# Patient Record
Sex: MALE | Race: WHITE | NOT HISPANIC OR LATINO | Employment: FULL TIME | URBAN - METROPOLITAN AREA
[De-identification: names, ages, dates, MRNs, and addresses within clinical notes are randomized per-mention and may not be internally consistent; named-entity substitution may affect disease eponyms.]

---

## 2023-09-29 ENCOUNTER — HOSPITAL ENCOUNTER (EMERGENCY)
Facility: HOSPITAL | Age: 28
End: 2023-09-29
Attending: INTERNAL MEDICINE
Payer: COMMERCIAL

## 2023-09-29 ENCOUNTER — HOSPITAL ENCOUNTER (INPATIENT)
Facility: HOSPITAL | Age: 28
LOS: 2 days | Discharge: HOME | End: 2023-10-01
Attending: INTERNAL MEDICINE | Admitting: STUDENT IN AN ORGANIZED HEALTH CARE EDUCATION/TRAINING PROGRAM
Payer: COMMERCIAL

## 2023-09-29 VITALS
HEART RATE: 113 BPM | TEMPERATURE: 97.4 F | SYSTOLIC BLOOD PRESSURE: 170 MMHG | HEIGHT: 75 IN | DIASTOLIC BLOOD PRESSURE: 91 MMHG | RESPIRATION RATE: 20 BRPM | WEIGHT: 262.79 LBS | OXYGEN SATURATION: 99 % | BODY MASS INDEX: 32.67 KG/M2

## 2023-09-29 DIAGNOSIS — R11.2 NAUSEA AND VOMITING: ICD-10-CM

## 2023-09-29 DIAGNOSIS — E10.10 DKA, TYPE 1 (HCC): Primary | ICD-10-CM

## 2023-09-29 DIAGNOSIS — E08.10 DIABETIC KETOACIDOSIS WITHOUT COMA ASSOCIATED WITH DIABETES MELLITUS DUE TO UNDERLYING CONDITION (HCC): Primary | ICD-10-CM

## 2023-09-29 PROBLEM — I10 HTN (HYPERTENSION): Status: ACTIVE | Noted: 2023-09-29

## 2023-09-29 PROBLEM — E87.5 HYPERKALEMIA: Status: ACTIVE | Noted: 2023-09-29

## 2023-09-29 PROBLEM — R00.0 TACHYCARDIA: Status: ACTIVE | Noted: 2023-09-29

## 2023-09-29 LAB
ALBUMIN SERPL BCP-MCNC: 5.4 G/DL (ref 3.5–5)
ALP SERPL-CCNC: 125 U/L (ref 34–104)
ALT SERPL W P-5'-P-CCNC: 14 U/L (ref 7–52)
ANION GAP SERPL CALCULATED.3IONS-SCNC: 18 MMOL/L
ANION GAP SERPL CALCULATED.3IONS-SCNC: 20 MMOL/L
ANION GAP SERPL CALCULATED.3IONS-SCNC: 27 MMOL/L
ANION GAP SERPL CALCULATED.3IONS-SCNC: 30 MMOL/L
AST SERPL W P-5'-P-CCNC: 11 U/L (ref 13–39)
BACTERIA UR QL AUTO: ABNORMAL /HPF
BASE EX.OXY STD BLDV CALC-SCNC: 68.8 % (ref 60–80)
BASE EX.OXY STD BLDV CALC-SCNC: 94 % (ref 60–80)
BASE EXCESS BLDV CALC-SCNC: -13.1 MMOL/L
BASE EXCESS BLDV CALC-SCNC: -15.9 MMOL/L
BASOPHILS # BLD AUTO: 0.05 THOUSANDS/ÂΜL (ref 0–0.1)
BASOPHILS NFR BLD AUTO: 0 % (ref 0–1)
BETA-HYDROXYBUTYRATE: 4.8 MMOL/L
BILIRUB SERPL-MCNC: 0.57 MG/DL (ref 0.2–1)
BILIRUB UR QL STRIP: NEGATIVE
BUN SERPL-MCNC: 26 MG/DL (ref 5–25)
BUN SERPL-MCNC: 27 MG/DL (ref 5–25)
BUN SERPL-MCNC: 29 MG/DL (ref 5–25)
BUN SERPL-MCNC: 30 MG/DL (ref 5–25)
CA-I BLD-SCNC: 1.14 MMOL/L (ref 1.12–1.32)
CALCIUM SERPL-MCNC: 10.2 MG/DL (ref 8.4–10.2)
CALCIUM SERPL-MCNC: 8.7 MG/DL (ref 8.4–10.2)
CALCIUM SERPL-MCNC: 9.2 MG/DL (ref 8.4–10.2)
CALCIUM SERPL-MCNC: 9.9 MG/DL (ref 8.4–10.2)
CHLORIDE SERPL-SCNC: 102 MMOL/L (ref 96–108)
CHLORIDE SERPL-SCNC: 103 MMOL/L (ref 96–108)
CHLORIDE SERPL-SCNC: 90 MMOL/L (ref 96–108)
CHLORIDE SERPL-SCNC: 93 MMOL/L (ref 96–108)
CLARITY UR: CLEAR
CO2 SERPL-SCNC: 11 MMOL/L (ref 21–32)
CO2 SERPL-SCNC: 13 MMOL/L (ref 21–32)
CO2 SERPL-SCNC: 14 MMOL/L (ref 21–32)
CO2 SERPL-SCNC: 9 MMOL/L (ref 21–32)
COLOR UR: ABNORMAL
CREAT SERPL-MCNC: 1.12 MG/DL (ref 0.6–1.3)
CREAT SERPL-MCNC: 1.15 MG/DL (ref 0.6–1.3)
CREAT SERPL-MCNC: 1.26 MG/DL (ref 0.6–1.3)
CREAT SERPL-MCNC: 1.37 MG/DL (ref 0.6–1.3)
EOSINOPHIL # BLD AUTO: 0 THOUSAND/ÂΜL (ref 0–0.61)
EOSINOPHIL NFR BLD AUTO: 0 % (ref 0–6)
ERYTHROCYTE [DISTWIDTH] IN BLOOD BY AUTOMATED COUNT: 11.9 % (ref 11.6–15.1)
ERYTHROCYTE [DISTWIDTH] IN BLOOD BY AUTOMATED COUNT: 12.1 % (ref 11.6–15.1)
FLUAV RNA RESP QL NAA+PROBE: NEGATIVE
FLUBV RNA RESP QL NAA+PROBE: NEGATIVE
GFR SERPL CREATININE-BSD FRML MDRD: 68 ML/MIN/1.73SQ M
GFR SERPL CREATININE-BSD FRML MDRD: 76 ML/MIN/1.73SQ M
GFR SERPL CREATININE-BSD FRML MDRD: 84 ML/MIN/1.73SQ M
GFR SERPL CREATININE-BSD FRML MDRD: 87 ML/MIN/1.73SQ M
GLUCOSE SERPL-MCNC: 251 MG/DL (ref 65–140)
GLUCOSE SERPL-MCNC: 251 MG/DL (ref 65–140)
GLUCOSE SERPL-MCNC: 271 MG/DL (ref 65–140)
GLUCOSE SERPL-MCNC: 272 MG/DL (ref 65–140)
GLUCOSE SERPL-MCNC: 273 MG/DL (ref 65–140)
GLUCOSE SERPL-MCNC: 273 MG/DL (ref 65–140)
GLUCOSE SERPL-MCNC: 276 MG/DL (ref 65–140)
GLUCOSE SERPL-MCNC: 361 MG/DL (ref 65–140)
GLUCOSE SERPL-MCNC: 455 MG/DL (ref 65–140)
GLUCOSE SERPL-MCNC: 472 MG/DL (ref 65–140)
GLUCOSE SERPL-MCNC: 526 MG/DL (ref 65–140)
GLUCOSE SERPL-MCNC: >500 MG/DL (ref 65–140)
GLUCOSE UR STRIP-MCNC: ABNORMAL MG/DL
HCO3 BLDV-SCNC: 10.1 MMOL/L (ref 24–30)
HCO3 BLDV-SCNC: 13.7 MMOL/L (ref 24–30)
HCT VFR BLD AUTO: 45.6 % (ref 36.5–49.3)
HCT VFR BLD AUTO: 51 % (ref 36.5–49.3)
HGB BLD-MCNC: 14.9 G/DL (ref 12–17)
HGB BLD-MCNC: 17.3 G/DL (ref 12–17)
HGB UR QL STRIP.AUTO: NEGATIVE
IMM GRANULOCYTES # BLD AUTO: 0.08 THOUSAND/UL (ref 0–0.2)
IMM GRANULOCYTES NFR BLD AUTO: 1 % (ref 0–2)
KETONES UR STRIP-MCNC: ABNORMAL MG/DL
LEUKOCYTE ESTERASE UR QL STRIP: NEGATIVE
LIPASE SERPL-CCNC: 8 U/L (ref 11–82)
LYMPHOCYTES # BLD AUTO: 2.48 THOUSANDS/ÂΜL (ref 0.6–4.47)
LYMPHOCYTES NFR BLD AUTO: 16 % (ref 14–44)
MAGNESIUM SERPL-MCNC: 2.3 MG/DL (ref 1.9–2.7)
MCH RBC QN AUTO: 27.6 PG (ref 26.8–34.3)
MCH RBC QN AUTO: 27.9 PG (ref 26.8–34.3)
MCHC RBC AUTO-ENTMCNC: 32.7 G/DL (ref 31.4–37.4)
MCHC RBC AUTO-ENTMCNC: 33.9 G/DL (ref 31.4–37.4)
MCV RBC AUTO: 82 FL (ref 82–98)
MCV RBC AUTO: 84 FL (ref 82–98)
MONOCYTES # BLD AUTO: 0.78 THOUSAND/ÂΜL (ref 0.17–1.22)
MONOCYTES NFR BLD AUTO: 5 % (ref 4–12)
MUCOUS THREADS UR QL AUTO: ABNORMAL
NEUTROPHILS # BLD AUTO: 12.3 THOUSANDS/ÂΜL (ref 1.85–7.62)
NEUTS SEG NFR BLD AUTO: 78 % (ref 43–75)
NITRITE UR QL STRIP: NEGATIVE
NON-SQ EPI CELLS URNS QL MICRO: ABNORMAL /HPF
NRBC BLD AUTO-RTO: 0 /100 WBCS
O2 CT BLDV-SCNC: 15.2 ML/DL
O2 CT BLDV-SCNC: 23.8 ML/DL
PCO2 BLDV: 26.3 MM HG (ref 42–50)
PCO2 BLDV: 34.9 MM HG (ref 42–50)
PH BLDV: 7.2 [PH] (ref 7.3–7.4)
PH BLDV: 7.21 [PH] (ref 7.3–7.4)
PH UR STRIP.AUTO: 5 [PH]
PHOSPHATE SERPL-MCNC: 3.8 MG/DL (ref 2.7–4.5)
PHOSPHATE SERPL-MCNC: 4.5 MG/DL (ref 2.7–4.5)
PHOSPHATE SERPL-MCNC: 5.7 MG/DL (ref 2.7–4.5)
PLATELET # BLD AUTO: 238 THOUSANDS/UL (ref 149–390)
PLATELET # BLD AUTO: 270 THOUSANDS/UL (ref 149–390)
PMV BLD AUTO: 10.5 FL (ref 8.9–12.7)
PMV BLD AUTO: 10.8 FL (ref 8.9–12.7)
PO2 BLDV: 38.7 MM HG (ref 35–45)
PO2 BLDV: 87.3 MM HG (ref 35–45)
POTASSIUM SERPL-SCNC: 4.6 MMOL/L (ref 3.5–5.3)
POTASSIUM SERPL-SCNC: 5.4 MMOL/L (ref 3.5–5.3)
POTASSIUM SERPL-SCNC: 5.4 MMOL/L (ref 3.5–5.3)
POTASSIUM SERPL-SCNC: 5.5 MMOL/L (ref 3.5–5.3)
PROT SERPL-MCNC: 9.6 G/DL (ref 6.4–8.4)
PROT UR STRIP-MCNC: ABNORMAL MG/DL
RBC # BLD AUTO: 5.4 MILLION/UL (ref 3.88–5.62)
RBC # BLD AUTO: 6.2 MILLION/UL (ref 3.88–5.62)
RBC #/AREA URNS AUTO: ABNORMAL /HPF
RSV RNA RESP QL NAA+PROBE: NEGATIVE
SARS-COV-2 RNA RESP QL NAA+PROBE: NEGATIVE
SODIUM SERPL-SCNC: 129 MMOL/L (ref 135–147)
SODIUM SERPL-SCNC: 131 MMOL/L (ref 135–147)
SODIUM SERPL-SCNC: 134 MMOL/L (ref 135–147)
SODIUM SERPL-SCNC: 136 MMOL/L (ref 135–147)
SP GR UR STRIP.AUTO: 1.02 (ref 1–1.04)
UROBILINOGEN UA: NEGATIVE MG/DL
WBC # BLD AUTO: 15.69 THOUSAND/UL (ref 4.31–10.16)
WBC # BLD AUTO: 15.7 THOUSAND/UL (ref 4.31–10.16)
WBC #/AREA URNS AUTO: ABNORMAL /HPF

## 2023-09-29 PROCEDURE — 80048 BASIC METABOLIC PNL TOTAL CA: CPT

## 2023-09-29 PROCEDURE — 80053 COMPREHEN METABOLIC PANEL: CPT | Performed by: INTERNAL MEDICINE

## 2023-09-29 PROCEDURE — 82010 KETONE BODYS QUAN: CPT | Performed by: INTERNAL MEDICINE

## 2023-09-29 PROCEDURE — 81003 URINALYSIS AUTO W/O SCOPE: CPT | Performed by: INTERNAL MEDICINE

## 2023-09-29 PROCEDURE — 81001 URINALYSIS AUTO W/SCOPE: CPT | Performed by: INTERNAL MEDICINE

## 2023-09-29 PROCEDURE — 99291 CRITICAL CARE FIRST HOUR: CPT | Performed by: INTERNAL MEDICINE

## 2023-09-29 PROCEDURE — 82948 REAGENT STRIP/BLOOD GLUCOSE: CPT

## 2023-09-29 PROCEDURE — 84100 ASSAY OF PHOSPHORUS: CPT | Performed by: INTERNAL MEDICINE

## 2023-09-29 PROCEDURE — 99223 1ST HOSP IP/OBS HIGH 75: CPT

## 2023-09-29 PROCEDURE — 82805 BLOOD GASES W/O2 SATURATION: CPT

## 2023-09-29 PROCEDURE — 83735 ASSAY OF MAGNESIUM: CPT | Performed by: INTERNAL MEDICINE

## 2023-09-29 PROCEDURE — 96375 TX/PRO/DX INJ NEW DRUG ADDON: CPT

## 2023-09-29 PROCEDURE — 96366 THER/PROPH/DIAG IV INF ADDON: CPT

## 2023-09-29 PROCEDURE — 83690 ASSAY OF LIPASE: CPT | Performed by: INTERNAL MEDICINE

## 2023-09-29 PROCEDURE — 36415 COLL VENOUS BLD VENIPUNCTURE: CPT | Performed by: INTERNAL MEDICINE

## 2023-09-29 PROCEDURE — 0241U HB NFCT DS VIR RESP RNA 4 TRGT: CPT | Performed by: INTERNAL MEDICINE

## 2023-09-29 PROCEDURE — 82330 ASSAY OF CALCIUM: CPT

## 2023-09-29 PROCEDURE — 85025 COMPLETE CBC W/AUTO DIFF WBC: CPT

## 2023-09-29 PROCEDURE — 82805 BLOOD GASES W/O2 SATURATION: CPT | Performed by: INTERNAL MEDICINE

## 2023-09-29 PROCEDURE — 83735 ASSAY OF MAGNESIUM: CPT

## 2023-09-29 PROCEDURE — 80048 BASIC METABOLIC PNL TOTAL CA: CPT | Performed by: INTERNAL MEDICINE

## 2023-09-29 PROCEDURE — 85027 COMPLETE CBC AUTOMATED: CPT | Performed by: INTERNAL MEDICINE

## 2023-09-29 PROCEDURE — 84100 ASSAY OF PHOSPHORUS: CPT

## 2023-09-29 PROCEDURE — 99285 EMERGENCY DEPT VISIT HI MDM: CPT

## 2023-09-29 PROCEDURE — 96365 THER/PROPH/DIAG IV INF INIT: CPT

## 2023-09-29 PROCEDURE — 93005 ELECTROCARDIOGRAM TRACING: CPT

## 2023-09-29 PROCEDURE — 96361 HYDRATE IV INFUSION ADD-ON: CPT

## 2023-09-29 RX ORDER — SODIUM CHLORIDE 9 MG/ML
3 INJECTION INTRAVENOUS
Status: DISCONTINUED | OUTPATIENT
Start: 2023-09-29 | End: 2023-09-29 | Stop reason: HOSPADM

## 2023-09-29 RX ORDER — CHLORHEXIDINE GLUCONATE ORAL RINSE 1.2 MG/ML
15 SOLUTION DENTAL EVERY 12 HOURS SCHEDULED
Status: DISCONTINUED | OUTPATIENT
Start: 2023-09-29 | End: 2023-10-01

## 2023-09-29 RX ORDER — SODIUM CHLORIDE, SODIUM GLUCONATE, SODIUM ACETATE, POTASSIUM CHLORIDE, MAGNESIUM CHLORIDE, SODIUM PHOSPHATE, DIBASIC, AND POTASSIUM PHOSPHATE .53; .5; .37; .037; .03; .012; .00082 G/100ML; G/100ML; G/100ML; G/100ML; G/100ML; G/100ML; G/100ML
1000 INJECTION, SOLUTION INTRAVENOUS ONCE
Status: COMPLETED | OUTPATIENT
Start: 2023-09-29 | End: 2023-09-29

## 2023-09-29 RX ORDER — LABETALOL HYDROCHLORIDE 5 MG/ML
10 INJECTION, SOLUTION INTRAVENOUS EVERY 6 HOURS
Status: DISCONTINUED | OUTPATIENT
Start: 2023-09-29 | End: 2023-09-29

## 2023-09-29 RX ORDER — ONDANSETRON 2 MG/ML
4 INJECTION INTRAMUSCULAR; INTRAVENOUS ONCE
Status: COMPLETED | OUTPATIENT
Start: 2023-09-29 | End: 2023-09-29

## 2023-09-29 RX ORDER — DEXTROSE, SODIUM CHLORIDE, SODIUM LACTATE, POTASSIUM CHLORIDE, AND CALCIUM CHLORIDE 5; .6; .31; .03; .02 G/100ML; G/100ML; G/100ML; G/100ML; G/100ML
250 INJECTION, SOLUTION INTRAVENOUS CONTINUOUS
Status: DISCONTINUED | OUTPATIENT
Start: 2023-09-29 | End: 2023-09-30

## 2023-09-29 RX ORDER — ONDANSETRON 2 MG/ML
4 INJECTION INTRAMUSCULAR; INTRAVENOUS EVERY 8 HOURS PRN
Status: DISCONTINUED | OUTPATIENT
Start: 2023-09-29 | End: 2023-09-29

## 2023-09-29 RX ORDER — LABETALOL HYDROCHLORIDE 5 MG/ML
10 INJECTION, SOLUTION INTRAVENOUS EVERY 6 HOURS PRN
Status: DISCONTINUED | OUTPATIENT
Start: 2023-09-29 | End: 2023-10-01 | Stop reason: HOSPADM

## 2023-09-29 RX ADMIN — DEXTROSE, SODIUM CHLORIDE, SODIUM LACTATE, POTASSIUM CHLORIDE, AND CALCIUM CHLORIDE 250 ML/HR: 5; .6; .31; .03; .02 INJECTION, SOLUTION INTRAVENOUS at 21:00

## 2023-09-29 RX ADMIN — SODIUM CHLORIDE, SODIUM GLUCONATE, SODIUM ACETATE, POTASSIUM CHLORIDE, MAGNESIUM CHLORIDE, SODIUM PHOSPHATE, DIBASIC, AND POTASSIUM PHOSPHATE 1000 ML: .53; .5; .37; .037; .03; .012; .00082 INJECTION, SOLUTION INTRAVENOUS at 20:16

## 2023-09-29 RX ADMIN — SODIUM CHLORIDE, SODIUM GLUCONATE, SODIUM ACETATE, POTASSIUM CHLORIDE, MAGNESIUM CHLORIDE, SODIUM PHOSPHATE, DIBASIC, AND POTASSIUM PHOSPHATE 1000 ML: .53; .5; .37; .037; .03; .012; .00082 INJECTION, SOLUTION INTRAVENOUS at 22:29

## 2023-09-29 RX ADMIN — SODIUM CHLORIDE 1000 ML: 0.9 INJECTION, SOLUTION INTRAVENOUS at 15:01

## 2023-09-29 RX ADMIN — SODIUM CHLORIDE 5.95 UNITS/HR: 9 INJECTION, SOLUTION INTRAVENOUS at 18:59

## 2023-09-29 RX ADMIN — SODIUM CHLORIDE 11.9 UNITS/HR: 9 INJECTION, SOLUTION INTRAVENOUS at 16:18

## 2023-09-29 RX ADMIN — ONDANSETRON 4 MG: 2 INJECTION INTRAMUSCULAR; INTRAVENOUS at 16:47

## 2023-09-29 RX ADMIN — CHLORHEXIDINE GLUCONATE 15 ML: 1.2 RINSE ORAL at 20:30

## 2023-09-29 RX ADMIN — SODIUM CHLORIDE 1000 ML: 0.9 INJECTION, SOLUTION INTRAVENOUS at 17:11

## 2023-09-29 RX ADMIN — SODIUM CHLORIDE 1000 ML: 0.9 INJECTION, SOLUTION INTRAVENOUS at 18:02

## 2023-09-29 RX ADMIN — SODIUM CHLORIDE 12 UNITS/HR: 9 INJECTION, SOLUTION INTRAVENOUS at 23:28

## 2023-09-29 NOTE — ED PROVIDER NOTES
History  Chief Complaint   Patient presents with   • Hyperglycemia - Symptomatic     Reports rapid increase in blood sugar since this morning. Last sugar 548. Feeling fatigued. A few episodes of vomiting. HPI  29-year-old man with a history of diabetes mellitus type 2 on insulin presents to ED for evaluation of high blood glucose readings, fatigue, nausea and vomiting. He states symptoms started this morning. He reports nonbloody nonbilious vomiting. Denies any recent infection. Denies any changes in insulin or medication noncompliance. Patient denies headache, visual changes, dizziness, fevers, chills, chest pain, palpitations, abdominal pain, diarrhea, melena, hematochezia, dysuria, new skin rashes or numbness or tingling of the extremities. None       Past Medical History:   Diagnosis Date   • Diabetes mellitus ([address])        Past Surgical History:   Procedure Laterality Date   • SINUS SURGERY         History reviewed. No pertinent family history. I have reviewed and agree with the history as documented. E-Cigarette/Vaping   • E-Cigarette Use Never User      E-Cigarette/Vaping Substances   • Nicotine No    • THC No    • CBD No    • Flavoring No      Social History     Tobacco Use   • Smoking status: Never   • Smokeless tobacco: Never   Vaping Use   • Vaping Use: Never used   Substance Use Topics   • Alcohol use: Not Currently       Review of Systems   All other systems reviewed and are negative.       Physical Exam  Physical Exam   Constitutional:  Well developed, no acute distress  HEENT:  Conjunctiva normal. Oropharynx moist  Respiratory:  No respiratory distress  Cardiovascular:  Normal rate  GI:  Soft, nondistended, nontender  :  No costovertebral angle tenderness   Musculoskeletal:  No edema, no tenderness, no deformities  Integument:  Well hydrated, no rash   Lymphatic:  No lymphadenopathy noted   Neurologic:  Alert & oriented x 3, normal motor function, no focal deficits noted Psychiatric:  Speech and behavior appropriate     Vital Signs  ED Triage Vitals [09/29/23 1452]   Temperature Pulse Respirations Blood Pressure SpO2   (!) 97.4 °F (36.3 °C) (!) 124 20 158/94 96 %      Temp Source Heart Rate Source Patient Position - Orthostatic VS BP Location FiO2 (%)   Tympanic Monitor Sitting Left arm --      Pain Score       --           Vitals:    09/29/23 1530 09/29/23 1600 09/29/23 1730 09/29/23 1800   BP: (!) 172/97 169/93 164/96 170/91   Pulse: (!) 106 (!) 111 (!) 116 (!) 113   Patient Position - Orthostatic VS: Sitting Sitting Lying Lying         Visual Acuity      ED Medications  Medications   sodium chloride 0.9 % bolus 1,000 mL (0 mL Intravenous Stopped 9/29/23 1619)   ondansetron (ZOFRAN) injection 4 mg (4 mg Intravenous Given 9/29/23 1647)   sodium chloride 0.9 % bolus 1,000 mL (0 mL Intravenous Stopped 9/29/23 1801)   sodium chloride 0.9 % bolus 1,000 mL (0 mL Intravenous Stopped 9/29/23 1900)       Diagnostic Studies  Results Reviewed     Procedure Component Value Units Date/Time    Basic metabolic panel [[id number]]     Lab Status: No result Specimen: Blood     Magnesium [[id number]]     Lab Status: No result Specimen: Blood     Phosphorus [[id number]]     Lab Status: No result Specimen: Blood     Basic metabolic panel [[id number]]     Lab Status: No result Specimen: Blood     Magnesium [[id number]]     Lab Status: No result Specimen: Blood     Phosphorus [[id number]]     Lab Status: No result Specimen: Blood     Fingerstick Glucose (POCT) [[id number]]  (Abnormal) Collected: 09/29/23 1954    Lab Status: Final result Updated: 09/29/23 1954     POC Glucose 251 mg/dl     Basic metabolic panel [[id number]]  (Abnormal) Collected: 09/29/23 1859    Lab Status: Final result Specimen: Blood from Arm, Right Updated: 09/29/23 1920     Sodium 136 mmol/L      Potassium 4.6 mmol/L      Chloride 102 mmol/L      CO2 14 mmol/L      ANION GAP 20 mmol/L      BUN 27 mg/dL      Creatinine 1.15 mg/dL Glucose 272 mg/dL      Calcium 9.2 mg/dL      eGFR 84 ml/min/1.73sq m     Narrative:      [facility] guidelines for Chronic Kid[facility] Disease (CKD):   •  Stage 1 with normal or high GFR (GFR > 90 mL/min/1.73 square meters)  •  Stage 2 Mild CKD (GFR = 60-89 mL/min/1.73 square meters)  •  Stage 3A Moderate CKD (GFR = 45-59 mL/min/1.73 square meters)  •  Stage 3B Moderate CKD (GFR = 30-44 mL/min/1.73 square meters)  •  Stage 4 Severe CKD (GFR = 15-29 mL/min/1.73 square meters)  •  Stage 5 End Stage CKD (GFR <15 mL/min/1.73 square meters)  Note: GFR calculation is accurate only with a steady state creatinine    Magnesium [[id number]]  (Normal) Collected: 09/29/23 1859    Lab Status: Final result Specimen: Blood from Arm, Right Updated: 09/29/23 1920     Magnesium 2.3 mg/dL     Phosphorus [[id number]]  (Normal) Collected: 09/29/23 1859    Lab Status: Final result Specimen: Blood from Arm, Right Updated: 09/29/23 1920     Phosphorus 4.5 mg/dL     Fingerstick Glucose (POCT) [[id number]]  (Abnormal) Collected: 09/29/23 1857    Lab Status: Final result Updated: 09/29/23 1859     POC Glucose 276 mg/dl     Fingerstick Glucose (POCT) [[id number]]  (Abnormal) Collected: 09/29/23 1757    Lab Status: Final result Updated: 09/29/23 1759     POC Glucose 361 mg/dl     Basic metabolic panel [[id number]]  (Abnormal) Collected: 09/29/23 1710    Lab Status: Final result Specimen: Blood from Arm, Right Updated: 09/29/23 1731     Sodium 131 mmol/L      Potassium 5.4 mmol/L      Chloride 93 mmol/L      CO2 11 mmol/L      ANION GAP 27 mmol/L      BUN 30 mg/dL      Creatinine 1.37 mg/dL      Glucose 472 mg/dL      Calcium 9.9 mg/dL      eGFR 68 ml/min/1.73sq m     Narrative:      [place] guidelines for Chronic Kidney Disease (CKD):   •  Stage 1 with normal or high GFR (GFR > 90 mL/min/1.73 square meters)  •  Stage 2 Mild CKD (GFR = 60-89 mL/min/1.73 square meters)  •  Stage 3A Moderate CKD (GFR = 45-59 mL/min/1.73 square meters)  •  Stage 3B Moderate CKD (GFR = 30-44 mL/min/1.73 square meters)  •  Stage 4 Severe CKD (GFR = 15-29 mL/min/1.73 square meters)  •  Stage 5 End Stage CKD (GFR <15 mL/min/1.73 square meters)  Note: GFR calculation is accurate only with a steady state creatinine    Phosphorus [[id number]]  (Abnormal) Collected: 09/29/23 1710    Lab Status: Final result Specimen: Blood from Arm, Right Updated: 09/29/23 1731     Phosphorus 5.7 mg/dL     Magnesium [[id number]]  (Normal) Collected: 09/29/23 1710    Lab Status: Final result Specimen: Blood from Arm, Right Updated: 09/29/23 1731     Magnesium 2.3 mg/dL     Basic metabolic panel [[id number]]     Lab Status: No result Specimen: Blood     Magnesium [[id number]]     Lab Status: No result Specimen: Blood     Phosphorus [[id number]]     Lab Status: No result Specimen: Blood     Fingerstick Glucose (POCT) [[id number]]  (Abnormal) Collected: 09/29/23 1709    Lab Status: Final result Updated: 09/29/23 1712     POC Glucose 455 mg/dl     Urine Microscopic [[id number]]  (Abnormal) Collected: 09/29/23 1603    Lab Status: Final result Specimen: Urine, Clean Catch Updated: 09/29/23 1630     RBC, UA None Seen /hpf      WBC, UA None Seen /hpf      Epithelial Cells Occasional /hpf      Bacteria, UA None Seen /hpf      MUCUS THREADS Occasional    UA w Reflex to Microscopic w Reflex to Culture [[id number]]  (Abnormal) Collected: 09/29/23 1603    Lab Status: Final result Specimen: Urine, Clean Catch Updated: 09/29/23 1620     Color, UA Straw     Clarity, UA Clear     Specific Gravity, UA 1.020     pH, UA 5.0     Leukocytes, UA Negative     Nitrite, UA Negative     Protein, UA 30 (1+) mg/dl      Glucose, UA >=1000 (1%) mg/dl      Ketones, UA >=150 (3+) mg/dl      Bilirubin, UA Negative     Occult Blood, UA Negative     UROBILINOGEN UA Negative mg/dL     COVID/FLU/RSV [[id number]]  (Normal) Collected: 09/29/23 1501    Lab Status: Final result Specimen: Nares from Nose Updated: 09/29/23 1547     SARS-CoV-2 Negative     INFLUENZA A PCR Negative     INFLUENZA B PCR Negative     RSV PCR Negative    Narrative:      FOR PEDIATRIC PATIENTS - copy/paste COVID Guidelines URL to browser: [email or web address]    SARS-CoV-2 assay is a Nucleic Acid Amplification assay intended for the  qualitative detection of nucleic acid from SARS-CoV-2 in nasopharyngeal  swabs. Results are for the presumptive identification of SARS-CoV-2 RNA. Positive results are indicative of infection with SARS-CoV-2, the virus  causing COVID-19, but do not rule out bacterial infection or co-infection  with other viruses. Laboratories within the [facility] and its  territories are required to report all positive results to the appropriate  public health authorities. Negative results do not preclude SARS-CoV-2  infection and should not be used as the sole basis for treatment or other  patient management decisions. Negative results must be combined with  clinical observations, patient history, and epidemiological information. This test has not been FDA cleared or approved. This test has been authorized by FDA under an Emergency Use Authorization  (EUA). This test is only authorized for the duration of time the  declaration that circumstances exist justifying the authorization of the  emergency use of an in vitro diagnostic tests for detection of SARS-CoV-2  virus and/or diagnosis of COVID-19 infection under section 564(b)(1) of  the Act, 21 U. S.C. 958JOH-8(W)(5), unless the authorization is terminated  or revoked sooner. The test has been validated but independent review by FDA  and CLIA is pending. Test performed using [employer]: This RT-PCR assay targets N2,  a region unique to SARS-CoV-2. A conserved region in the E-gene was chosen  for pan-Sarbecovirus detection which includes SARS-CoV-2.     According to CMS-2020-01-R, this platform meets the definition of high-throughput technology.     Comprehensive metabolic panel [[id number]]  (Abnormal) Collected: 09/29/23 1501    Lab Status: Final result Specimen: Blood from Arm, Left Updated: 09/29/23 1533     Sodium 129 mmol/L      Potassium 5.4 mmol/L      Chloride 90 mmol/L      CO2 9 mmol/L      ANION GAP 30 mmol/L      BUN 29 mg/dL      Creatinine 1.26 mg/dL      Glucose 526 mg/dL      Calcium 10.2 mg/dL      AST 11 U/L      ALT 14 U/L      Alkaline Phosphatase 125 U/L      Total Protein 9.6 g/dL      Albumin 5.4 g/dL      Total Bilirubin 0.57 mg/dL      eGFR 76 ml/min/1.73sq m     Narrative:      [place] guidelines for Chronic Kidney Disease (CKD):   •  Stage 1 with normal or high GFR (GFR > 90 mL/min/1.73 square meters)  •  Stage 2 Mild CKD (GFR = 60-89 mL/min/1.73 square meters)  •  Stage 3A Moderate CKD (GFR = 45-59 mL/min/1.73 square meters)  •  Stage 3B Moderate CKD (GFR = 30-44 mL/min/1.73 square meters)  •  Stage 4 Severe CKD (GFR = 15-29 mL/min/1.73 square meters)  •  Stage 5 End Stage CKD (GFR <15 mL/min/1.73 square meters)  Note: GFR calculation is accurate only with a steady state creatinine    Lipase [[id number]]  (Abnormal) Collected: 09/29/23 1501    Lab Status: Final result Specimen: Blood from Arm, Left Updated: 09/29/23 1527     Lipase 8 u/L     Beta Hydroxybutyrate [[id number]]  (Abnormal) Collected: 09/29/23 1501    Lab Status: Final result Specimen: Blood from Arm, Left Updated: 09/29/23 1514     BETA-HYDROXYBUTYRATE 4.8 mmol/L     Blood gas, venous [[id number]]  (Abnormal) Collected: 09/29/23 1501    Lab Status: Final result Specimen: Blood from Arm, Left Updated: 09/29/23 1512     pH, Gerardo 7.203     pCO2, Gerardo 26.3 mm Hg      pO2, Gerardo 87.3 mm Hg      HCO3, Gerardo 10.1 mmol/L      Base Excess, Gerardo -15.9 mmol/L      O2 Content, Egrardo 23.8 ml/dL      O2 HGB, VENOUS 94.0 %     CBC [[id number]]  (Abnormal) Collected: 09/29/23 1501    Lab Status: Final result Specimen: Blood from Arm, Left Updated: 09/29/23 1508     WBC 15.70 Thousand/uL      RBC 6.20 Million/uL      Hemoglobin 17.3 g/dL      Hematocrit 51.0 %      MCV 82 fL      MCH 27.9 pg      MCHC 33.9 g/dL      RDW 11.9 %      Platelets 896 Thousands/uL      MPV 10.8 fL     Fingerstick Glucose (POCT) [[id number]]  (Abnormal) Collected: 09/29/23 1449    Lab Status: Final result Updated: 09/29/23 1451     POC Glucose >500 mg/dl                  No orders to display              Procedures  CriticalCare Time    Date/Time: 9/29/2023 3:56 PM    Performed by: Bill Goldsmith MD  Authorized by: Bill Goldsmith MD    Critical care provider statement:     Critical care time (minutes):  33    Critical care was necessary to treat or prevent imminent or life-threatening deterioration of the following conditions:  Endocrine crisis (DKA)    Critical care was time spent personally by me on the following activities:  Blood draw for specimens, obtaining history from patient or surrogate, development of treatment plan with patient or surrogate, evaluation of patient's response to treatment, examination of patient, discussions with consultants, ordering and performing treatments and interventions, ordering and review of laboratory studies, re-evaluation of patient's condition and review of old charts    I assumed direction of critical care for this patient from another provider in my specialty: no               ED Course  ED Course as of 09/29/23 2035   Fri Sep 29, 2023   1504 POC Glucose(!!): >500  DKA work-up ordered   1509 WBC(!): 15.70   1509 Hemoglobin(!): 17.3   1509 Platelet Count: 786   1515 pH, Gerardo(!): 7.203   1516 Base Excess, Gerardo: -15.9   1516 BETA-HYDROXYBUTYRATE(!): 4.8   1534 Potassium(!): 5.4  K > 5.2, ok to start insulin infusion without K repletion    1536 Sodium(!): 129  Corrected sodium is 136   1537 Awaiting PACS call to start transfer process   1700 I discussed pt with Dr. Cannon Lefort, critical care attending, pt is accept to their service at [facility] Most Recent Value   Initial Alcohol Screen: US AUDIT-C     1. How often do you have a drink containing alcohol? 2 Filed at: 09/29/2023 1538   2. How many drinks containing alcohol do you have on a typical day you are drinking? 0 Filed at: 09/29/2023 1538   3a. Male UNDER 65: How often do you have five or more drinks on one occasion? 0 Filed at: 09/29/2023 1538   Audit-C Score 2 Filed at: 09/29/2023 1538   ALLISON: How many times in the past year have you. .. Used an illegal drug or used a prescription medication for non-medical reasons? Never Filed at: 09/29/2023 1538                    Medical Decision Making  Differential diagnosis includes DKA, HHS, infection, dehydration JUANPABLO, CKD, metabolic disturbance. Less likely to be ischemia or medication noncompliance given history    Amount and/or Complexity of Data Reviewed  Labs: ordered. Decision-making details documented in ED Course. Risk  Prescription drug management. Disposition  Final diagnoses:   Diabetic ketoacidosis without coma associated with diabetes mellitus due to underlying condition ([address])   Nausea and vomiting     Time reflects when diagnosis was documented in both MDM as applicable and the Disposition within this note     Time User Action Codes Description Comment    9/29/2023  3:55 PM Magdalene Lev Add [E08.10] Diabetic ketoacidosis without coma associated with diabetes mellitus due to underlying condition ([address])     9/29/2023  3:55 PM Magdalene Lev Add [R11.2] Nausea and vomiting       ED Disposition     ED Disposition   Transfer to Another Facility-In Network    Condition   --    Date/Time   Fri Sep 29, 2023  4:25 PM    Comment   Evelin Sen should be transferred out to [facility] [place] [facility]".            MD Documentation    Flowsheet Row Most Recent Value   Patient Condition The patient has been stabilized such that within reasonable medical probability, no material deterioration of the patient condition or the condition of the unborn child(lencho) is likely to result from the transfer   Reason for Transfer Level of Care needed not available at this facility   Benefits of Transfer Specialized equipment and/or services available at the receiving facility (Include comment)________________________   Risks of Transfer Potential for delay in receiving treatment, Potential deterioration of medical condition, Loss of IV, Possible worsening of condition or death during transfer, Increased discomfort during transfer   Accepting Physician [address] Name, Thony Adjutant   Sending MD [facility]   Provider Certification General risk, such as traffic hazards, adverse weather conditions, rough terrain or turbulence, possible failure of equipment (including vehicle or aircraft), or consequences of actions of persons outside the control of the transport personnel, Unanticipated needs of medical equipment and personnel during transport, Risk of worsening condition, The possibility of a transport vehicle being unavailable      RN Documentation    Flowsheet Row Most [address] Name, Thony Adjutant   Report Given to [place], [place]   Medications Reviewed with Next Provider of Service Yes   Level of Care Advanced life support   Patient Belongings Disposition Sent with patient      Follow-up Information    None         There are no discharge medications for this patient. No discharge procedures on file.     PDMP Review     None          ED Provider  Electronically Signed by           Pietro Skelton MD  09/29/23 2039

## 2023-09-29 NOTE — EMTALA/ACUTE CARE TRANSFER
[facility] EMERGENCY DEPARTMENT  [address] [id number] [phone]  Dept: [phone]      EMTALA TRANSFER CONSENT    NAME Tiburcio Gilford DOB 1995                              MRN [id number]    I have been informed of my rights regarding examination, treatment, and transfer   by Dr. Shaggy Navarro MD    Benefits: Specialized equipment and/or services available at the receiving facility (Include comment)________________________    Risks: Potential for delay in receiving treatment, Potential deterioration of medical condition, Loss of IV, Possible worsening of condition or death during transfer, Increased discomfort during transfer      Consent for Transfer:  I acknowledge that my medical condition has been evaluated and explained to me by the emergency department physician or other qualified medical person and/or my attending physician, who has recommended that I be transferred to the service of  Accepting Physician: Amy Llamas at [address] [place] [address] [address] Name, [address] : [place]. The above potential benefits of such transfer, the potential risks associated with such transfer, and the probable risks of not being transferred have been explained to me, and I fully understand them. The doctor has explained that, in my case, the benefits of transfer outweigh the risks. I agree to be transferred. I authorize the performance of emergency medical procedures and treatments upon me in both transit and upon arrival at the receiving facility. Additionally, I authorize the release of any and all medical records to the receiving facility and request they be transported with me, if possible. I understand that the safest mode of transportation during a medical emergency is an ambulance and that the Hospital advocates the use of this mode of transport.  Risks of traveling to the receiving facility by car, including absence of medical control, life sustaining equipment, such as oxygen, and medical personnel has been explained to me and I fully understand them. (FEDE CORRECT BOX BELOW)  [  ]  I consent to the stated transfer and to be transported by ambulance/helicopter. [  ]  I consent to the stated transfer, but refuse transportation by ambulance and accept full responsibility for my transportation by car. I understand the risks of non-ambulance transfers and I exonerate the Hospital and its staff from any deterioration in my condition that results from this refusal.    X___________________________________________    DATE  23  TIME________  Signature of patient or legally responsible individual signing on patient behalf           RELATIONSHIP TO PATIENT_________________________          Provider Certification    NAME Samantha Laguerre                                        [facility] 1995                              MRN [id number]    A medical screening exam was performed on the above named patient. Based on the examination:    Condition Necessitating Transfer The primary encounter diagnosis was Diabetic ketoacidosis without coma associated with diabetes mellitus due to underlying condition ([address]). A diagnosis of Nausea and vomiting was also pertinent to this visit.     Patient Condition: The patient has been stabilized such that within reasonable medical probability, no material deterioration of the patient condition or the condition of the unborn child(lencho) is likely to result from the transfer    Reason for Transfer: Level of Care needed not available at this facility    Transfer Requirements: [facility] [address]   · Space available and qualified personnel available for treatment as acknowledged by    · Agreed to accept transfer and to provide appropriate medical treatment as acknowledged by       Juan  · Appropriate medical records of the examination and treatment of the patient are provided at the time of transfer   STAFF INITIAL WHEN COMPLETED _______  · Transfer will be performed by qualified personnel from    and appropriate transfer equipment as required, including the use of necessary and appropriate life support measures. Provider Certification: I have examined the patient and explained the following risks and benefits of being transferred/refusing transfer to the patient/family:  General risk, such as traffic hazards, adverse weather conditions, rough terrain or turbulence, possible failure of equipment (including vehicle or aircraft), or consequences of actions of persons outside the control of the transport personnel, Unanticipated needs of medical equipment and personnel during transport, Risk of worsening condition, The possibility of a transport vehicle being unavailable      Based on these reasonable risks and benefits to the patient and/or the unborn child(lencho), and based upon the information available at the time of the patient’s examination, I certify that the medical benefits reasonably to be expected from the provision of appropriate medical treatments at another medical facility outweigh the increasing risks, if any, to the individual’s medical condition, and in the case of labor to the unborn child, from effecting the transfer.     X____________________________________________ DATE 09/29/23        TIME_______      ORIGINAL - SEND TO MEDICAL RECORDS   COPY - SEND WITH PATIENT DURING TRANSFER

## 2023-09-29 NOTE — ED NOTES
Insulin drip not increased; will recheck at 1800 and adjust if needed. MD Vanita Catherine aware.      Lori Stevenson RN  09/29/23 6980

## 2023-09-30 PROBLEM — E87.5 HYPERKALEMIA: Status: RESOLVED | Noted: 2023-09-29 | Resolved: 2023-09-30

## 2023-09-30 LAB
ANION GAP SERPL CALCULATED.3IONS-SCNC: 12 MMOL/L
ANION GAP SERPL CALCULATED.3IONS-SCNC: 8 MMOL/L
ANION GAP SERPL CALCULATED.3IONS-SCNC: 9 MMOL/L
ARTERIAL PATENCY WRIST A: YES
ATRIAL RATE: 125 BPM
BASE EX.OXY STD BLDV CALC-SCNC: 90.9 % (ref 60–80)
BASE EXCESS BLDV CALC-SCNC: -6.8 MMOL/L
BASOPHILS # BLD AUTO: 0.05 THOUSANDS/ÂΜL (ref 0–0.1)
BASOPHILS NFR BLD AUTO: 0 % (ref 0–1)
BUN SERPL-MCNC: 21 MG/DL (ref 5–25)
BUN SERPL-MCNC: 21 MG/DL (ref 5–25)
BUN SERPL-MCNC: 22 MG/DL (ref 5–25)
CALCIUM SERPL-MCNC: 8.3 MG/DL (ref 8.4–10.2)
CALCIUM SERPL-MCNC: 8.5 MG/DL (ref 8.4–10.2)
CALCIUM SERPL-MCNC: 8.6 MG/DL (ref 8.4–10.2)
CHLORIDE SERPL-SCNC: 102 MMOL/L (ref 96–108)
CHLORIDE SERPL-SCNC: 105 MMOL/L (ref 96–108)
CHLORIDE SERPL-SCNC: 107 MMOL/L (ref 96–108)
CO2 SERPL-SCNC: 17 MMOL/L (ref 21–32)
CO2 SERPL-SCNC: 21 MMOL/L (ref 21–32)
CO2 SERPL-SCNC: 22 MMOL/L (ref 21–32)
CREAT SERPL-MCNC: 0.81 MG/DL (ref 0.6–1.3)
CREAT SERPL-MCNC: 0.86 MG/DL (ref 0.6–1.3)
CREAT SERPL-MCNC: 0.93 MG/DL (ref 0.6–1.3)
EOSINOPHIL # BLD AUTO: 0.03 THOUSAND/ÂΜL (ref 0–0.61)
EOSINOPHIL NFR BLD AUTO: 0 % (ref 0–6)
ERYTHROCYTE [DISTWIDTH] IN BLOOD BY AUTOMATED COUNT: 12.1 % (ref 11.6–15.1)
EST. AVERAGE GLUCOSE BLD GHB EST-MCNC: 223 MG/DL
GFR SERPL CREATININE-BSD FRML MDRD: 109 ML/MIN/1.73SQ M
GFR SERPL CREATININE-BSD FRML MDRD: 116 ML/MIN/1.73SQ M
GFR SERPL CREATININE-BSD FRML MDRD: 119 ML/MIN/1.73SQ M
GLUCOSE SERPL-MCNC: 102 MG/DL (ref 65–140)
GLUCOSE SERPL-MCNC: 122 MG/DL (ref 65–140)
GLUCOSE SERPL-MCNC: 129 MG/DL (ref 65–140)
GLUCOSE SERPL-MCNC: 151 MG/DL (ref 65–140)
GLUCOSE SERPL-MCNC: 164 MG/DL (ref 65–140)
GLUCOSE SERPL-MCNC: 176 MG/DL (ref 65–140)
GLUCOSE SERPL-MCNC: 181 MG/DL (ref 65–140)
GLUCOSE SERPL-MCNC: 195 MG/DL (ref 65–140)
GLUCOSE SERPL-MCNC: 205 MG/DL (ref 65–140)
GLUCOSE SERPL-MCNC: 212 MG/DL (ref 65–140)
GLUCOSE SERPL-MCNC: 216 MG/DL (ref 65–140)
GLUCOSE SERPL-MCNC: 250 MG/DL (ref 65–140)
GLUCOSE SERPL-MCNC: 271 MG/DL (ref 65–140)
GLUCOSE SERPL-MCNC: 275 MG/DL (ref 65–140)
GLUCOSE SERPL-MCNC: 291 MG/DL (ref 65–140)
GLUCOSE SERPL-MCNC: 80 MG/DL (ref 65–140)
HBA1C MFR BLD: 9.4 %
HCO3 BLDV-SCNC: 18.6 MMOL/L (ref 24–30)
HCT VFR BLD AUTO: 40.3 % (ref 36.5–49.3)
HGB BLD-MCNC: 13.6 G/DL (ref 12–17)
IMM GRANULOCYTES # BLD AUTO: 0.04 THOUSAND/UL (ref 0–0.2)
IMM GRANULOCYTES NFR BLD AUTO: 0 % (ref 0–2)
LYMPHOCYTES # BLD AUTO: 3.46 THOUSANDS/ÂΜL (ref 0.6–4.47)
LYMPHOCYTES NFR BLD AUTO: 27 % (ref 14–44)
MAGNESIUM SERPL-MCNC: 2 MG/DL (ref 1.9–2.7)
MAGNESIUM SERPL-MCNC: 2.2 MG/DL (ref 1.9–2.7)
MCH RBC QN AUTO: 28 PG (ref 26.8–34.3)
MCHC RBC AUTO-ENTMCNC: 33.7 G/DL (ref 31.4–37.4)
MCV RBC AUTO: 83 FL (ref 82–98)
MONOCYTES # BLD AUTO: 1.33 THOUSAND/ÂΜL (ref 0.17–1.22)
MONOCYTES NFR BLD AUTO: 10 % (ref 4–12)
NEUTROPHILS # BLD AUTO: 7.99 THOUSANDS/ÂΜL (ref 1.85–7.62)
NEUTS SEG NFR BLD AUTO: 63 % (ref 43–75)
NON VENT ROOM AIR: 95 %
NRBC BLD AUTO-RTO: 0 /100 WBCS
O2 CT BLDV-SCNC: 18.6 ML/DL
P AXIS: 55 DEGREES
PCO2 BLDV: 37.4 MM HG (ref 42–50)
PH BLDV: 7.32 [PH] (ref 7.3–7.4)
PHOSPHATE SERPL-MCNC: 2.5 MG/DL (ref 2.7–4.5)
PHOSPHATE SERPL-MCNC: 3 MG/DL (ref 2.7–4.5)
PLATELET # BLD AUTO: 229 THOUSANDS/UL (ref 149–390)
PMV BLD AUTO: 9.9 FL (ref 8.9–12.7)
PO2 BLDV: 63.7 MM HG (ref 35–45)
POTASSIUM SERPL-SCNC: 3.9 MMOL/L (ref 3.5–5.3)
POTASSIUM SERPL-SCNC: 3.9 MMOL/L (ref 3.5–5.3)
POTASSIUM SERPL-SCNC: 5 MMOL/L (ref 3.5–5.3)
PR INTERVAL: 158 MS
QRS AXIS: 53 DEGREES
QRSD INTERVAL: 88 MS
QT INTERVAL: 296 MS
QTC INTERVAL: 427 MS
RBC # BLD AUTO: 4.86 MILLION/UL (ref 3.88–5.62)
SODIUM SERPL-SCNC: 132 MMOL/L (ref 135–147)
SODIUM SERPL-SCNC: 134 MMOL/L (ref 135–147)
SODIUM SERPL-SCNC: 137 MMOL/L (ref 135–147)
T WAVE AXIS: 51 DEGREES
VENTRICULAR RATE: 125 BPM
WBC # BLD AUTO: 12.9 THOUSAND/UL (ref 4.31–10.16)

## 2023-09-30 PROCEDURE — 83735 ASSAY OF MAGNESIUM: CPT

## 2023-09-30 PROCEDURE — 80048 BASIC METABOLIC PNL TOTAL CA: CPT

## 2023-09-30 PROCEDURE — 83036 HEMOGLOBIN GLYCOSYLATED A1C: CPT | Performed by: STUDENT IN AN ORGANIZED HEALTH CARE EDUCATION/TRAINING PROGRAM

## 2023-09-30 PROCEDURE — 93010 ELECTROCARDIOGRAM REPORT: CPT | Performed by: INTERNAL MEDICINE

## 2023-09-30 PROCEDURE — 82805 BLOOD GASES W/O2 SATURATION: CPT

## 2023-09-30 PROCEDURE — 85025 COMPLETE CBC W/AUTO DIFF WBC: CPT

## 2023-09-30 PROCEDURE — 84100 ASSAY OF PHOSPHORUS: CPT

## 2023-09-30 PROCEDURE — 99232 SBSQ HOSP IP/OBS MODERATE 35: CPT | Performed by: INTERNAL MEDICINE

## 2023-09-30 PROCEDURE — 82948 REAGENT STRIP/BLOOD GLUCOSE: CPT

## 2023-09-30 RX ORDER — INSULIN LISPRO 100 [IU]/ML
10 INJECTION, SOLUTION INTRAVENOUS; SUBCUTANEOUS
Status: DISCONTINUED | OUTPATIENT
Start: 2023-09-30 | End: 2023-10-01 | Stop reason: HOSPADM

## 2023-09-30 RX ORDER — INSULIN LISPRO 100 [IU]/ML
2-12 INJECTION, SOLUTION INTRAVENOUS; SUBCUTANEOUS
Status: DISCONTINUED | OUTPATIENT
Start: 2023-09-30 | End: 2023-10-01 | Stop reason: HOSPADM

## 2023-09-30 RX ORDER — POTASSIUM CHLORIDE 20 MEQ/1
20 TABLET, EXTENDED RELEASE ORAL ONCE
Status: COMPLETED | OUTPATIENT
Start: 2023-09-30 | End: 2023-09-30

## 2023-09-30 RX ORDER — INSULIN GLARGINE 100 [IU]/ML
60 INJECTION, SOLUTION SUBCUTANEOUS EVERY MORNING
Status: DISCONTINUED | OUTPATIENT
Start: 2023-09-30 | End: 2023-10-01 | Stop reason: HOSPADM

## 2023-09-30 RX ORDER — INSULIN LISPRO 100 [IU]/ML
1-5 INJECTION, SOLUTION INTRAVENOUS; SUBCUTANEOUS
Status: DISCONTINUED | OUTPATIENT
Start: 2023-09-30 | End: 2023-10-01 | Stop reason: HOSPADM

## 2023-09-30 RX ADMIN — CHLORHEXIDINE GLUCONATE 15 ML: 1.2 RINSE ORAL at 09:18

## 2023-09-30 RX ADMIN — POTASSIUM CHLORIDE 20 MEQ: 1500 TABLET, EXTENDED RELEASE ORAL at 02:07

## 2023-09-30 RX ADMIN — INSULIN GLARGINE 60 UNITS: 100 INJECTION, SOLUTION SUBCUTANEOUS at 09:13

## 2023-09-30 RX ADMIN — INSULIN LISPRO 6 UNITS: 100 INJECTION, SOLUTION INTRAVENOUS; SUBCUTANEOUS at 12:27

## 2023-09-30 RX ADMIN — CHLORHEXIDINE GLUCONATE 15 ML: 1.2 RINSE ORAL at 21:44

## 2023-09-30 RX ADMIN — DEXTROSE, SODIUM CHLORIDE, SODIUM LACTATE, POTASSIUM CHLORIDE, AND CALCIUM CHLORIDE 250 ML/HR: 5; .6; .31; .03; .02 INJECTION, SOLUTION INTRAVENOUS at 01:35

## 2023-09-30 RX ADMIN — SODIUM PHOSPHATE, MONOBASIC, MONOHYDRATE AND SODIUM PHOSPHATE, DIBASIC, ANHYDROUS 30 MMOL: 142; 276 INJECTION, SOLUTION INTRAVENOUS at 02:44

## 2023-09-30 RX ADMIN — INSULIN LISPRO 10 UNITS: 100 INJECTION, SOLUTION INTRAVENOUS; SUBCUTANEOUS at 12:26

## 2023-09-30 RX ADMIN — POTASSIUM CHLORIDE 20 MEQ: 1500 TABLET, EXTENDED RELEASE ORAL at 04:44

## 2023-09-30 RX ADMIN — INSULIN LISPRO 1 UNITS: 100 INJECTION, SOLUTION INTRAVENOUS; SUBCUTANEOUS at 21:44

## 2023-09-30 RX ADMIN — INSULIN LISPRO 6 UNITS: 100 INJECTION, SOLUTION INTRAVENOUS; SUBCUTANEOUS at 16:16

## 2023-09-30 RX ADMIN — INSULIN LISPRO 10 UNITS: 100 INJECTION, SOLUTION INTRAVENOUS; SUBCUTANEOUS at 16:15

## 2023-09-30 NOTE — H&P
[facility]  H&P  Name: Angelika Armando 25 y.o. male I MRN: [id number]  Unit/Bed#: ICU 05 I Date of Admission: 9/29/2023   Date of Service: 9/29/2023 I Hospital Day: 0      Assessment/Plan   * DKA, type 1 [facility])  Assessment & Plan  No results found for: "HGBA1C"    Recent Labs     09/29/23  1709 09/29/23  1757 09/29/23  1857 09/29/23 1954   POCGLU 455* 361* 276* 251*     9/29: presented to [facility] ED for complaints of N/V, excessive thirst, and report of increasing blood glucose levels at home despite giving himself SQ insulin  Labs POA: Na 129, K 5.4, CO2 9, ; VBG 7.20/26/87/10.1; BE: -15.9; BHB 4.8; UA: >1,000 glucose, +3 ketones  - Pt denies recent sick contacts or feeling ill recently. Has not seen an Endocrinologist in 4 years per pt report. Pt states he purchases his insulin OTC at [facility] and has remained on the same regimen since the last time he saw his Endocrinologist.  - Received 3L of NS at [facility] and placed on regular insulin drip  - Upon arrival to [facility], pt denies N/V; reports thirstiness. Denies all other complaints. Plan:  - DKA protocol  - Infuse 1L isolyte  - q1 hour BG checks  - Monitor and treat hypoglycemia  - Q4 BMP, Mag, Phos, VBG  - Replace electrolytes per DKA nursing protocol; avoid hypokalemia  - Consult Endocrinologist prior to discharge  - NPO with sips of water  - Transition to SQ insulin once AG is closed x 2        Tachycardia  Assessment & Plan  - Pt exhibiting sinus tachycardia with -130s; BP stable; afebrile  - Etiology: likely in the setting of hypovolemia 2/2 DKA; low suspicion for active infection at this time. Pt denies feeling ill recently or sick contacts. - EKG at [facility] showed sinus tachycardia without P wave or ST changes    Plan:  - 1L isolyte bolus  - Continuous telemetry  - Monitor and replete electrolytes  - Strict I&Os    Hyperkalemia  Assessment & Plan  K level POA: 5.4.  Not treated in the setting of DKA protocol with insulin infusion. Down-trending with repeat labs. - EKG at [facility] showed sinus tachycardia without P wave or ST changes    Plan:  - Q4 BMP  - Replete K per DKA nursing electrolyte protocol  - Continuous telemetry    HTN (hypertension)  Assessment & Plan  - Pt SBP ranging 150-170mmHg. Pt states he has not been seeing a PCP for 5 years and just re-established care with a PCP last Friday    Plan:  - Monitor BP  - PRN labetalol for SBP >180mmHg         History of Present Illness     HPI: Ildefonso Baird is a 25 y.o. with a PMHx of DMI who presents as a transfer from [facility] with labs consistent with DKA. Pt states that he was monitoring his BG at home and noticed that it kept increasing despite giving himself correction doses of SQ insulin. He denies recent illness or being in contact with ill persons. He states that he presented to the ED because of ongoing nausea and vomiting with oral intake. He reports excessive thirst, but denies polyuria. He denies diarrhea or feeling febrile. The patient states he has not been following an Endocrinologist regularly for 4 years and has used the same insulin regimen for the last 4 years without adjustments. The pt states he buys his insulin OTC at [facility]. Upon arrival to [facility] ICU, the pt was tachycardic with HR int he 130s and was given a 1L isolyte bolus of fluid. The patient's blood glucose was 250 and D5LR @250cc/hr started with continuation of regular insulin infusion. `  History obtained from the patient. Review of Systems   Constitutional: Negative. HENT: Negative. Eyes: Negative. Respiratory: Negative. Cardiovascular: Negative. Gastrointestinal: Negative. Endocrine: Positive for polydipsia. Genitourinary: Negative. Musculoskeletal: Negative. Skin: Negative. Allergic/Immunologic: Negative. Neurological: Negative. Psychiatric/Behavioral: Negative. All other systems reviewed and are negative.       Historical Information   Past Medical History:  No date: Diabetes mellitus ([address]) Past Surgical History:  No date: SINUS SURGERY   No current outpatient medications No Known Allergies   Social History     Tobacco Use   • Smoking status: Never   • Smokeless tobacco: Never   Vaping Use   • Vaping Use: Never used   Substance Use Topics   • Alcohol use: Not Currently    No family history on file. Objective                            Vitals I/O      Most Recent Min/Max in 24hrs   Temp 98.4 °F (36.9 °C) Temp  Min: 97.4 °F (36.3 °C)  Max: 98.4 °F (36.9 °C)   Pulse (!) 106 Pulse  Min: 106  Max: 130   Resp 20 Resp  Min: 18  Max: 32   /77 BP  Min: 153/83  Max: 172/97   O2 Sat 99 % SpO2  Min: 95 %  Max: 100 %    No intake or output data in the 24 hours ending 09/29/23 2151      Diet NPO; Sips of clear liquids     Invasive Monitoring Physical exam    Physical Exam  Eyes:      General: Vision grossly intact. No scleral icterus. Extraocular Movements: Extraocular movements intact. Pupils: Pupils are equal, round, and reactive to light. Skin:     General: Skin is warm and dry. HENT:      Head: Normocephalic and atraumatic. Mouth/Throat:      Mouth: Mucous membranes are moist.   Neck:      Vascular: No JVD. No midline tenderness Cardiovascular:      Rate and Rhythm: Regular rhythm. Tachycardia present. Pulses: Normal pulses. Heart sounds: Normal heart sounds. Musculoskeletal:         General: Normal range of motion. Right lower leg: No edema. Left lower leg: No edema. Abdominal:      General: Bowel sounds are normal. There is no distension. Palpations: Abdomen is soft. Tenderness: There is no abdominal tenderness. Constitutional:       General: He is not in acute distress. Appearance: He is well-developed. He is not ill-appearing or toxic-appearing. Pulmonary:      Effort: No respiratory distress. Breath sounds: Normal breath sounds. No stridor.  No wheezing, rhonchi or rales. Neurological:      General: No focal deficit present. Mental Status: He is alert and oriented to person, place and time. Mental status is at baseline. Sensory: Sensation is intact. Motor: gross motor function is at baseline for patient. Strength full and intact in all extremities. Vitals reviewed. Diagnostic Studies      [id number]: Per my assessment, the patient appears to be in sinus tachycardia, , no ST changes. Imaging: no new imaging      I have personally reviewed pertinent reports.    and I have personally reviewed pertinent films in PACS     Medications:  Scheduled PRN   chlorhexidine, 15 mL, Q12H [facility]      labetalol, 10 mg, Q6H PRN       Continuous    dextrose 5% lactated ringer's, 250 mL/hr, Last Rate: 250 mL/hr (23)  insulin regular (HumuLIN R,NovoLIN R) 1 Units/mL in sodium chloride 0.9 % 100 mL infusion, 0.1-30 Units/hr, Last Rate: 5.95 Units/hr (23)         Labs:    CBC    Recent Labs     23  1501 23   WBC 15.70* 15.69*   HGB 17.3* 14.9   HCT 51.0* 45.6    238     BMP    Recent Labs     23   SODIUM 136 134*   K 4.6 5.5*    103   CO2 14* 13*   AGAP 20 18   BUN 27* 26*   CREATININE 1.15 1.12   CALCIUM 9.2 8.7       Coags    No recent results     Additional Electrolytes  Recent Labs     23  18523   MG 2.3 2.3   PHOS 4.5 3.8   CAIONIZED  --  1.14          Blood Gas    No recent results  Recent Labs     23   PHVEN 7.212*   [id number] 34.9*   PO2VEN 38.7   VBP1NMC 13.7*   BEVEN -13.1    LFTs  Recent Labs     23  1501   ALT 14   AST 11*   ALKPHOS 125*   ALB 5.4*   TBILI 0.57       Infectious  No recent results  Glucose  Recent Labs     23  1501 23  1710 23   GLUC 526* 472* 272* 273*             Anticipated Length of Stay is > 2 midnights    Xi Louis, [address] AP Fellow

## 2023-09-30 NOTE — PLAN OF CARE
Problem: PAIN - ADULT  Goal: Verbalizes/displays adequate comfort level or baseline comfort level  Description: Interventions:  - Encourage patient to monitor pain and request assistance  - Assess pain using appropriate pain scale  - Administer analgesics based on type and severity of pain and evaluate response  - Implement non-pharmacological measures as appropriate and evaluate response  - Consider cultural and social influences on pain and pain management  - Notify physician/advanced practitioner if interventions unsuccessful or patient reports new pain  Outcome: Progressing     Problem: INFECTION - ADULT  Goal: Absence or prevention of progression during hospitalization  Description: INTERVENTIONS:  - Assess and monitor for signs and symptoms of infection  - Monitor lab/diagnostic results  - Monitor all insertion sites, i.e. indwelling lines, tubes, and drains  - Monitor endotracheal if appropriate and nasal secretions for changes in amount and color  - Mclean appropriate cooling/warming therapies per order  - Administer medications as ordered  - Instruct and encourage patient and family to use good hand hygiene technique  - Identify and instruct in appropriate isolation precautions for identified infection/condition  Outcome: Progressing  Goal: Absence of fever/infection during neutropenic period  Description: INTERVENTIONS:  - Monitor WBC    Outcome: Progressing     Problem: SAFETY ADULT  Goal: Patient will remain free of falls  Description: INTERVENTIONS:  - Educate patient/family on patient safety including physical limitations  - Instruct patient to call for assistance with activity   - Consult OT/PT to assist with strengthening/mobility   - Keep Call bell within reach  - Keep bed low and locked with side rails adjusted as appropriate  - Keep care items and personal belongings within reach  - Initiate and maintain comfort rounds  - Make Fall Risk Sign visible to staff  - Apply yellow socks and bracelet for high fall risk patients  - Consider moving patient to room near nurses station  Outcome: Progressing  Goal: Maintain or return to baseline ADL function  Description: INTERVENTIONS:  -  Assess patient's ability to carry out ADLs; assess patient's baseline for ADL function and identify physical deficits which impact ability to perform ADLs (bathing, care of mouth/teeth, toileting, grooming, dressing, etc.)  - Assess/evaluate cause of self-care deficits   - Assess range of motion  - Assess patient's mobility; develop plan if impaired  - Assess patient's need for assistive devices and provide as appropriate  - Encourage maximum independence but intervene and supervise when necessary  - Involve family in performance of ADLs  - Assess for home care needs following discharge   - Consider OT consult to assist with ADL evaluation and planning for discharge  - Provide patient education as appropriate  Outcome: Progressing  Goal: Maintains/Returns to pre admission functional level  Description: INTERVENTIONS:  - Perform BMAT or MOVE assessment daily.   - Set and communicate daily mobility goal to care team and patient/family/caregiver.    - Collaborate with rehabilitation services on mobility goals if consulted  - Out of bed for toileting  - Record patient progress and toleration of activity level   Outcome: Progressing

## 2023-09-30 NOTE — ASSESSMENT & PLAN NOTE
No results found for: "HGBA1C"    Recent Labs     09/29/23  1709 09/29/23  1757 09/29/23  1857 09/29/23 1954   POCGLU 455* 361* 276* 251*     9/29: presented to [facility] ED for complaints of N/V, excessive thirst, and report of increasing blood glucose levels at home despite giving himself SQ insulin  Labs POA: Na 129, K 5.4, CO2 9, ; VBG 7.20/26/87/10.1; BE: -15.9; BHB 4.8; UA: >1,000 glucose, +3 ketones  - Pt denies recent sick contacts or feeling ill recently. Has not seen an Endocrinologist in 4 years per pt report. Pt states he purchases his insulin OTC at [facility] and has remained on the same regimen since the last time he saw his Endocrinologist.  - Received 3L of NS at [facility] and placed on regular insulin drip  - Upon arrival to [facility], pt denies N/V; reports thirstiness. Denies all other complaints.      Plan:  - DKA protocol  - Infuse 1L isolyte  - q1 hour BG checks  - Monitor and treat hypoglycemia  - Q4 BMP, Mag, Phos, VBG  - Replace electrolytes per DKA nursing protocol; avoid hypokalemia  - Consult Endocrinologist prior to discharge  - NPO with sips of water  - Transition to SQ insulin once AG is closed x 2

## 2023-09-30 NOTE — ASSESSMENT & PLAN NOTE
- Resolved during treatment of DKA with insulin infusion and dextrose-containing fluid infusion   K level POA: 5.4. Not treated in the setting of DKA protocol with insulin infusion. Down-trending with repeat labs.    - EKG at [facility] showed sinus tachycardia without P wave or ST changes    Plan:  - Replete K per DKA nursing electrolyte protocol  - Continuous telemetry

## 2023-09-30 NOTE — PROGRESS NOTES
[address] [facility]  Progress Note  Name: Mishel Ochoa  MRN: [id number]  Unit/Bed#: ICU 05 I Date of Admission: 9/29/2023   Date of Service: 9/30/2023 I Hospital Day: 1    Assessment/Plan   * DKA, type 1 [facility])  Assessment & Plan  No results found for: "HGBA1C"    Recent Labs     09/30/23  0123 09/30/23  0209 09/30/23  0307 09/30/23  0346   POCGLU 176* 164* 151* 122     9/29: presented to [facility] ED for complaints of N/V, excessive thirst, and report of increasing blood glucose levels at home despite giving himself SQ insulin  Labs POA: Na 129, K 5.4, CO2 9, ; VBG 7.20/26/87/10.1; BE: -15.9; BHB 4.8; UA: >1,000 glucose, +3 ketones  - Pt denies recent sick contacts or feeling ill recently. Has not seen an Endocrinologist in 4 years per pt report. Pt states he purchases his insulin OTC at [facility] and has remained on the same regimen since the last time he saw his Endocrinologist.  - Received 3L of NS at [facility] and placed on regular insulin drip  - Upon arrival to [facility], pt denies N/V; reports thirstiness. Denies all other complaints. - Overnight: received additional 2L isolyte fluid boluses for s/s hypovolemia. Serial labs overnight show improving metabolic acidosis, and AG closed x 2. Plan:  - Discontinue DKA protocol  - Transition to non-DKA insulin infusion with Q2 BG monitoring  - Discontinue dextrose-containing fluid  - Start clear liquid diet  - Monitor and treat hypoglycemia and electrolyte abnormalities  - Endocrinology consulted - recs appreciated    Tachycardia  Assessment & Plan  - Pt exhibiting sinus tachycardia with -130s; BP stable; afebrile  - Etiology: likely in the setting of hypovolemia 2/2 DKA; low suspicion for active infection at this time. Pt denies feeling ill recently or sick contacts.   - EKG at [facility] showed sinus tachycardia without P wave or ST changes  - Overnight: pt received 2L isolyte fluid boluses for HR 130s with improvement to HR . Plan:  - Continuous telemetry  - Monitor and replete electrolytes  - Strict I&Os  - Consider additional fluid if indicated    Hyperkalemia-resolved as of 9/30/2023  Assessment & Plan  - Resolved during treatment of DKA with insulin infusion and dextrose-containing fluid infusion   K level POA: 5.4. Not treated in the setting of DKA protocol with insulin infusion. Down-trending with repeat labs. - EKG at [facility] showed sinus tachycardia without P wave or ST changes    Plan:  - Replete K per DKA nursing electrolyte protocol  - Continuous telemetry    HTN (hypertension)  Assessment & Plan  - Pt SBP ranging 150-170mmHg. Pt states he has not been seeing a PCP for 5 years and just re-established care with a PCP last Friday    Plan:  - Monitor BP  - PRN labetalol for SBP >180mmHg         ICU Core Measures     A: Assess, Prevent, and Manage Pain · Has pain been assessed? Yes  · Need for changes to pain regimen? No   B: Both SAT/SAT  · N/A   C: Choice of Sedation · RASS Goal: N/A patient not on sedation  · Need for changes to sedation or analgesia regimen? NA   D: Delirium · CAM-ICU: Negative   E: Early Mobility  · Plan for early mobility? Yes   F: Family Engagement · Plan for family engagement today? Yes       Prophylaxis:  VTE    Stress Ulcer  not ordered       Subjective      HPI/24hr events:  Overnight, the pt received 2L isotonic fluid boluses for HR 130s with improvement down to . Pt denies all complaints. Anion gap closed x 2; patient transitioned to regular insulin infusion and continuous dextrose-containing fluids dc'ed. Consider downgrading out of ICU today. Review of Systems   Constitutional: Negative. HENT: Negative. Eyes: Negative. Respiratory: Negative. Cardiovascular: Negative. Gastrointestinal: Negative. Endocrine: Negative. Genitourinary: Negative. Musculoskeletal: Negative. Skin: Negative. Neurological: Negative. Psychiatric/Behavioral: Negative.     All other systems reviewed and are negative. Objective                            Vitals I/O      Most Recent Min/Max in 24hrs   Temp 98.7 °F (37.1 °C) Temp  Min: 97.4 °F (36.3 °C)  Max: 98.7 °F (37.1 °C)   Pulse 92 Pulse  Min: 92  Max: 130   Resp (!) 8 Resp  Min: 8  Max: 32   /66 BP  Min: 121/72  Max: 172/97   O2 Sat 100 % SpO2  Min: 95 %  Max: 100 %      Intake/Output Summary (Last 24 hours) at 9/30/2023 0432  Last data filed at 9/30/2023 0200  Gross per 24 hour   Intake 1013.5 ml   Output 1475 ml   Net -461.5 ml         Diet NPO; Sips of clear liquids     Invasive Monitoring Physical exam    Physical Exam  Eyes:      General: No scleral icterus. Extraocular Movements: Extraocular movements intact. Pupils: Pupils are equal, round, and reactive to light. Skin:     General: Skin is warm and dry. HENT:      Head: Normocephalic and atraumatic. Mouth/Throat:      Mouth: Mucous membranes are moist.   Neck:      Vascular: No JVD. No midline tenderness Cardiovascular:      Rate and Rhythm: Normal rate and regular rhythm. Heart sounds: No murmur heard. No friction rub. No gallop. Musculoskeletal:         General: Normal range of motion. Right lower leg: No edema. Left lower leg: No edema. Abdominal:      General: Bowel sounds are normal. There is no distension. Palpations: Abdomen is soft. Tenderness: There is no abdominal tenderness. Constitutional:       General: He is not in acute distress. Appearance: He is not ill-appearing. Pulmonary:      Effort: Pulmonary effort is normal.      Breath sounds: Normal breath sounds. No wheezing, rhonchi or rales. Neurological:      General: No focal deficit present. Mental Status: He is alert and oriented to person, place and time. Mental status is at baseline. Sensory: Sensation is intact. Motor: gross motor function is at baseline for patient. Strength full and intact in all extremities.    Vitals reviewed. Diagnostic Studies      EKG: (no new EKG reports)  Imaging: (no new image reports)     I have personally reviewed pertinent reports.    and I have personally reviewed pertinent films in PACS   Medications:  Scheduled PRN   chlorhexidine, 15 mL, Q12H [address]  potassium chloride, 20 mEq, Once  sodium phosphate, 30 mmol, Once      labetalol, 10 mg, Q6H PRN       Continuous    insulin regular (HumuLIN R,NovoLIN R) 1 Units/mL in sodium chloride 0.9 % 100 mL infusion, 0.3-21 Units/hr         Labs:    CBC    Recent Labs     09/29/23  1501 09/29/23 2113   WBC 15.70* 15.69*   HGB 17.3* 14.9   HCT 51.0* 45.6    238     BMP    Recent Labs     09/30/23 0033 09/30/23  0350   SODIUM 134* 137   K 5.0 3.9    107   CO2 17* 22   AGAP 12 8   BUN 22 21   CREATININE 0.93 0.86   CALCIUM 8.3* 8.5       Coags    No recent results     Additional Electrolytes  Recent Labs     09/29/23 2113 09/30/23  0033 09/30/23  0350   MG 2.3 2.2 2.0   PHOS 3.8 2.5* 3.0   CAIONIZED 1.14  --   --           Blood Gas    No recent results  Recent Labs     09/30/23  0038   PHVEN 7.315   OQU5SWC 37.4*   PO2VEN 63.7*   DXU9YPM 18.6*   BEVEN -6.8    LFTs  Recent Labs     09/29/23  1501   ALT 14   AST 11*   ALKPHOS 125*   ALB 5.4*   TBILI 0.57       Infectious  No recent results  Glucose  Recent Labs     09/29/23  1859 09/29/23 2113 09/30/23  0033 09/30/23  0350   GLUC 272* 273* 205* 129             JORJE Silverio   Critical Care AP Fellow

## 2023-09-30 NOTE — ASSESSMENT & PLAN NOTE
- Pt exhibiting sinus tachycardia with -130s; BP stable; afebrile  - Etiology: likely in the setting of hypovolemia 2/2 DKA; low suspicion for active infection at this time. Pt denies feeling ill recently or sick contacts.   - EKG at [facility] showed sinus tachycardia without P wave or ST changes    Plan:  - 1L isolyte bolus  - Continuous telemetry  - Monitor and replete electrolytes  - Strict I&Os

## 2023-09-30 NOTE — ASSESSMENT & PLAN NOTE
No results found for: "HGBA1C"    Recent Labs     09/30/23  0123 09/30/23  0209 09/30/23  0307 09/30/23  0346   POCGLU 176* 164* 151* 122     9/29: presented to [facility] ED for complaints of N/V, excessive thirst, and report of increasing blood glucose levels at home despite giving himself SQ insulin  Labs POA: Na 129, K 5.4, CO2 9, ; VBG 7.20/26/87/10.1; BE: -15.9; BHB 4.8; UA: >1,000 glucose, +3 ketones  - Pt denies recent sick contacts or feeling ill recently. Has not seen an Endocrinologist in 4 years per pt report. Pt states he purchases his insulin OTC at [facility] and has remained on the same regimen since the last time he saw his Endocrinologist.  - Received 3L of NS at [facility] and placed on regular insulin drip  - Upon arrival to [facility], pt denies N/V; reports thirstiness. Denies all other complaints. - Overnight: received additional 2L isolyte fluid boluses for s/s hypovolemia. Serial labs overnight show improving metabolic acidosis, and AG closed x 2.     Plan:  - Discontinue DKA protocol  - Transition to non-DKA insulin infusion with Q2 BG monitoring  - Discontinue dextrose-containing fluid  - Start clear liquid diet  - Monitor and treat hypoglycemia and electrolyte abnormalities  - Endocrinology consulted - recs appreciated

## 2023-09-30 NOTE — ASSESSMENT & PLAN NOTE
- Pt SBP ranging 150-170mmHg.  Pt states he has not been seeing a PCP for 5 years and just re-established care with a PCP last Friday    Plan:  - Monitor BP  - PRN labetalol for SBP >180mmHg

## 2023-09-30 NOTE — ASSESSMENT & PLAN NOTE
K level POA: 5.4. Not treated in the setting of DKA protocol with insulin infusion. Down-trending with repeat labs.    - EKG at [facility] showed sinus tachycardia without P wave or ST changes    Plan:  - Q4 BMP  - Replete K per DKA nursing electrolyte protocol  - Continuous telemetry

## 2023-09-30 NOTE — PLAN OF CARE
Problem: PAIN - ADULT  Goal: Verbalizes/displays adequate comfort level or baseline comfort level  Description: Interventions:  - Encourage patient to monitor pain and request assistance  - Assess pain using appropriate pain scale  - Administer analgesics based on type and severity of pain and evaluate response  - Implement non-pharmacological measures as appropriate and evaluate response  - Consider cultural and social influences on pain and pain management  - Notify physician/advanced practitioner if interventions unsuccessful or patient reports new pain  Outcome: Progressing     Problem: INFECTION - ADULT  Goal: Absence or prevention of progression during hospitalization  Description: INTERVENTIONS:  - Assess and monitor for signs and symptoms of infection  - Monitor lab/diagnostic results  - Monitor all insertion sites, i.e. indwelling lines, tubes, and drains  - Monitor endotracheal if appropriate and nasal secretions for changes in amount and color  - [place] appropriate cooling/warming therapies per order  - Administer medications as ordered  - Instruct and encourage patient and family to use good hand hygiene technique  - Identify and instruct in appropriate isolation precautions for identified infection/condition  Outcome: Progressing     Problem: SAFETY ADULT  Goal: Patient will remain free of falls  Description: INTERVENTIONS:  - Educate patient/family on patient safety including physical limitations  - Instruct patient to call for assistance with activity   - Consult OT/PT to assist with strengthening/mobility   - Keep Call bell within reach  - Keep bed low and locked with side rails adjusted as appropriate  - Keep care items and personal belongings within reach  - Initiate and maintain comfort rounds  - Make Fall Risk Sign visible to staff  - Offer Toileting every  Hours, in advance of need  - Initiate/Maintain alarm  - Obtain necessary fall risk management equipment:   - Apply yellow socks and bracelet for high fall risk patients  - Consider moving patient to room near nurses station  Outcome: Progressing  Goal: Maintain or return to baseline ADL function  Description: INTERVENTIONS:  -  Assess patient's ability to carry out ADLs; assess patient's baseline for ADL function and identify physical deficits which impact ability to perform ADLs (bathing, care of mouth/teeth, toileting, grooming, dressing, etc.)  - Assess/evaluate cause of self-care deficits   - Assess range of motion  - Assess patient's mobility; develop plan if impaired  - Assess patient's need for assistive devices and provide as appropriate  - Encourage maximum independence but intervene and supervise when necessary  - Involve family in performance of ADLs  - Assess for home care needs following discharge   - Consider OT consult to assist with ADL evaluation and planning for discharge  - Provide patient education as appropriate  Outcome: Progressing  Goal: Maintains/Returns to pre admission functional level  Description: INTERVENTIONS:  - Perform BMAT or MOVE assessment daily.   - Set and communicate daily mobility goal to care team and patient/family/caregiver. - Collaborate with rehabilitation services on mobility goals if consulted  - Perform Range of Motion  times a day. - Reposition patient every  hours.   - Dangle patient  times a day  - Stand patient  times a day  - Ambulate patient  times a day  - Out of bed to chair  times a day   - Out of bed for meals  times a day  - Out of bed for toileting  - Record patient progress and toleration of activity level   Outcome: Progressing     Problem: DISCHARGE PLANNING  Goal: Discharge to home or other facility with appropriate resources  Description: INTERVENTIONS:  - Identify barriers to discharge w/patient and caregiver  - Arrange for needed discharge resources and transportation as appropriate  - Identify discharge learning needs (meds, wound care, etc.)  - Arrange for interpretive services to assist at discharge as needed  - Refer to Case Management Department for coordinating discharge planning if the patient needs post-hospital services based on physician/advanced practitioner order or complex needs related to functional status, cognitive ability, or social support system  Outcome: Progressing     Problem: Knowledge Deficit  Goal: Patient/family/caregiver demonstrates understanding of disease process, treatment plan, medications, and discharge instructions  Description: Complete learning assessment and assess knowledge base.   Interventions:  - Provide teaching at level of understanding  - Provide teaching via preferred learning methods  Outcome: Progressing

## 2023-09-30 NOTE — ASSESSMENT & PLAN NOTE
- Pt exhibiting sinus tachycardia with -130s; BP stable; afebrile  - Etiology: likely in the setting of hypovolemia 2/2 DKA; low suspicion for active infection at this time. Pt denies feeling ill recently or sick contacts. - EKG at [facility] showed sinus tachycardia without P wave or ST changes  - Overnight: pt received 2L isolyte fluid boluses for HR 130s with improvement to HR .     Plan:  - Continuous telemetry  - Monitor and replete electrolytes  - Strict I&Os  - Consider additional fluid if indicated

## 2023-10-01 VITALS
TEMPERATURE: 97.5 F | DIASTOLIC BLOOD PRESSURE: 86 MMHG | HEART RATE: 93 BPM | HEIGHT: 74 IN | SYSTOLIC BLOOD PRESSURE: 139 MMHG | RESPIRATION RATE: 18 BRPM | OXYGEN SATURATION: 99 % | BODY MASS INDEX: 35 KG/M2 | WEIGHT: 272.71 LBS

## 2023-10-01 LAB
GLUCOSE SERPL-MCNC: 241 MG/DL (ref 65–140)
GLUCOSE SERPL-MCNC: 328 MG/DL (ref 65–140)

## 2023-10-01 PROCEDURE — 82948 REAGENT STRIP/BLOOD GLUCOSE: CPT

## 2023-10-01 PROCEDURE — 99239 HOSP IP/OBS DSCHRG MGMT >30: CPT | Performed by: INTERNAL MEDICINE

## 2023-10-01 RX ORDER — INSULIN LISPRO 100 [IU]/ML
10 INJECTION, SOLUTION INTRAVENOUS; SUBCUTANEOUS
Qty: 10 ML | Refills: 0 | Status: SHIPPED | OUTPATIENT
Start: 2023-10-01

## 2023-10-01 RX ORDER — INSULIN GLARGINE 100 [IU]/ML
35 INJECTION, SOLUTION SUBCUTANEOUS 2 TIMES DAILY
Qty: 15 ML | Refills: 0 | Status: SHIPPED | OUTPATIENT
Start: 2023-10-01 | End: 2023-10-01

## 2023-10-01 RX ORDER — INSULIN GLARGINE 100 [IU]/ML
35 INJECTION, SOLUTION SUBCUTANEOUS EVERY 12 HOURS SCHEDULED
Qty: 20 ML | Refills: 0 | Status: SHIPPED | OUTPATIENT
Start: 2023-10-01 | End: 2023-10-01 | Stop reason: SDUPTHER

## 2023-10-01 RX ORDER — INSULIN LISPRO 100 [IU]/ML
10 INJECTION, SOLUTION INTRAVENOUS; SUBCUTANEOUS
Qty: 15 ML | Refills: 0 | Status: SHIPPED | OUTPATIENT
Start: 2023-10-01 | End: 2023-10-01

## 2023-10-01 RX ORDER — INSULIN LISPRO 100 [IU]/ML
10 INJECTION, SOLUTION INTRAVENOUS; SUBCUTANEOUS
Qty: 10 ML | Refills: 0 | Status: SHIPPED | OUTPATIENT
Start: 2023-10-01 | End: 2023-10-01 | Stop reason: SDUPTHER

## 2023-10-01 RX ORDER — INSULIN GLARGINE 100 [IU]/ML
35 INJECTION, SOLUTION SUBCUTANEOUS EVERY 12 HOURS SCHEDULED
Qty: 20 ML | Refills: 0 | Status: SHIPPED | OUTPATIENT
Start: 2023-10-01

## 2023-10-01 RX ADMIN — INSULIN GLARGINE 60 UNITS: 100 INJECTION, SOLUTION SUBCUTANEOUS at 09:26

## 2023-10-01 RX ADMIN — INSULIN LISPRO 10 UNITS: 100 INJECTION, SOLUTION INTRAVENOUS; SUBCUTANEOUS at 07:41

## 2023-10-01 RX ADMIN — INSULIN LISPRO 8 UNITS: 100 INJECTION, SOLUTION INTRAVENOUS; SUBCUTANEOUS at 07:41

## 2023-10-01 RX ADMIN — INSULIN LISPRO 4 UNITS: 100 INJECTION, SOLUTION INTRAVENOUS; SUBCUTANEOUS at 11:42

## 2023-10-01 RX ADMIN — INSULIN LISPRO 10 UNITS: 100 INJECTION, SOLUTION INTRAVENOUS; SUBCUTANEOUS at 11:42

## 2023-10-01 NOTE — PLAN OF CARE
Problem: PAIN - ADULT  Goal: Verbalizes/displays adequate comfort level or baseline comfort level  Description: Interventions:  - Encourage patient to monitor pain and request assistance  - Assess pain using appropriate pain scale  - Administer analgesics based on type and severity of pain and evaluate response  - Implement non-pharmacological measures as appropriate and evaluate response  - Consider cultural and social influences on pain and pain management  - Notify physician/advanced practitioner if interventions unsuccessful or patient reports new pain  Outcome: Progressing     Problem: INFECTION - ADULT  Goal: Absence or prevention of progression during hospitalization  Description: INTERVENTIONS:  - Assess and monitor for signs and symptoms of infection  - Monitor lab/diagnostic results  - Monitor all insertion sites, i.e. indwelling lines, tubes, and drains  - Monitor endotracheal if appropriate and nasal secretions for changes in amount and color  - [place] appropriate cooling/warming therapies per order  - Administer medications as ordered  - Instruct and encourage patient and family to use good hand hygiene technique  - Identify and instruct in appropriate isolation precautions for identified infection/condition  Outcome: Progressing  Goal: Absence of fever/infection during neutropenic period  Description: INTERVENTIONS:  - Monitor WBC    Outcome: Progressing     Problem: SAFETY ADULT  Goal: Patient will remain free of falls  Description: INTERVENTIONS:  - Educate patient/family on patient safety including physical limitations  - Instruct patient to call for assistance with activity   - Consult OT/PT to assist with strengthening/mobility   - Keep Call bell within reach  - Keep bed low and locked with side rails adjusted as appropriate  - Keep care items and personal belongings within reach  - Initiate and maintain comfort rounds  - Make Fall Risk Sign visible to staff  - Offer Toileting every 2 Hours, in advance of need  - Initiate/Maintain bed alarm  - Obtain necessary fall risk management equipment  - Apply yellow socks and bracelet for high fall risk patients  - Consider moving patient to room near nurses station  Outcome: Progressing  Goal: Maintain or return to baseline ADL function  Description: INTERVENTIONS:  -  Assess patient's ability to carry out ADLs; assess patient's baseline for ADL function and identify physical deficits which impact ability to perform ADLs (bathing, care of mouth/teeth, toileting, grooming, dressing, etc.)  - Assess/evaluate cause of self-care deficits   - Assess range of motion  - Assess patient's mobility; develop plan if impaired  - Assess patient's need for assistive devices and provide as appropriate  - Encourage maximum independence but intervene and supervise when necessary  - Involve family in performance of ADLs  - Assess for home care needs following discharge   - Consider OT consult to assist with ADL evaluation and planning for discharge  - Provide patient education as appropriate  Outcome: Progressing  Goal: Maintains/Returns to pre admission functional level  Description: INTERVENTIONS:  - Perform BMAT or MOVE assessment daily.   - Set and communicate daily mobility goal to care team and patient/family/caregiver. - Collaborate with rehabilitation services on mobility goals if consulted  - Perform Range of Motion 2 times a day. - Reposition patient every 2 hours.   - Dangle patient 2 times a day  - Stand patient 2 times a day  - Ambulate patient 2 times a day  - Out of bed to chair 2 times a day   - Out of bed for meals 2 times a day  - Out of bed for toileting  - Record patient progress and toleration of activity level   Outcome: Progressing     Problem: DISCHARGE PLANNING  Goal: Discharge to home or other facility with appropriate resources  Description: INTERVENTIONS:  - Identify barriers to discharge w/patient and caregiver  - Arrange for needed discharge resources and transportation as appropriate  - Identify discharge learning needs (meds, wound care, etc.)  - Arrange for interpretive services to assist at discharge as needed  - Refer to Case Management Department for coordinating discharge planning if the patient needs post-hospital services based on physician/advanced practitioner order or complex needs related to functional status, cognitive ability, or social support system  Outcome: Progressing     Problem: Knowledge Deficit  Goal: Patient/family/caregiver demonstrates understanding of disease process, treatment plan, medications, and discharge instructions  Description: Complete learning assessment and assess knowledge base.   Interventions:  - Provide teaching at level of understanding  - Provide teaching via preferred learning methods  Outcome: Progressing

## 2023-10-01 NOTE — PLAN OF CARE
Problem: PAIN - ADULT  Goal: Verbalizes/displays adequate comfort level or baseline comfort level  Description: Interventions:  - Encourage patient to monitor pain and request assistance  - Assess pain using appropriate pain scale  - Administer analgesics based on type and severity of pain and evaluate response  - Implement non-pharmacological measures as appropriate and evaluate response  - Consider cultural and social influences on pain and pain management  - Notify physician/advanced practitioner if interventions unsuccessful or patient reports new pain  Outcome: Progressing     Problem: INFECTION - ADULT  Goal: Absence or prevention of progression during hospitalization  Description: INTERVENTIONS:  - Assess and monitor for signs and symptoms of infection  - Monitor lab/diagnostic results  - Monitor all insertion sites, i.e. indwelling lines, tubes, and drains  - Monitor endotracheal if appropriate and nasal secretions for changes in amount and color  - [place] appropriate cooling/warming therapies per order  - Administer medications as ordered  - Instruct and encourage patient and family to use good hand hygiene technique  - Identify and instruct in appropriate isolation precautions for identified infection/condition  Outcome: Progressing  Goal: Absence of fever/infection during neutropenic period  Description: INTERVENTIONS:  - Monitor WBC    Outcome: Progressing     Problem: SAFETY ADULT  Goal: Patient will remain free of falls  Description: INTERVENTIONS:  - Educate patient/family on patient safety including physical limitations  - Instruct patient to call for assistance with activity   - Consult OT/PT to assist with strengthening/mobility   - Keep Call bell within reach  - Keep bed low and locked with side rails adjusted as appropriate  - Keep care items and personal belongings within reach  - Initiate and maintain comfort rounds  - Make Fall Risk Sign visible to staff  - Offer Toileting every 2 Hours, in advance of need  - Apply yellow socks and bracelet for high fall risk patients  - Consider moving patient to room near nurses station  Outcome: Progressing  Goal: Maintain or return to baseline ADL function  Description: INTERVENTIONS:  -  Assess patient's ability to carry out ADLs; assess patient's baseline for ADL function and identify physical deficits which impact ability to perform ADLs (bathing, care of mouth/teeth, toileting, grooming, dressing, etc.)  - Assess/evaluate cause of self-care deficits   - Assess range of motion  - Assess patient's mobility; develop plan if impaired  - Assess patient's need for assistive devices and provide as appropriate  - Encourage maximum independence but intervene and supervise when necessary  - Involve family in performance of ADLs  - Assess for home care needs following discharge   - Consider OT consult to assist with ADL evaluation and planning for discharge  - Provide patient education as appropriate  Outcome: Progressing  Goal: Maintains/Returns to pre admission functional level  Description: INTERVENTIONS:  - Out of bed to chair 3 times a day   - Out of bed for meals 3 times a day  - Out of bed for toileting  - Record patient progress and toleration of activity level   Outcome: Progressing

## 2023-10-01 NOTE — CASE MANAGEMENT
Case Management Discharge Planning Note    Patient name Nikole Cruise  Location [address] [facility] 2 /E2 -* MRN [id number]  : 1995 Date 10/1/2023       Current Admission Date: 2023  Current Admission Diagnosis:DKA, type 1 [facility])   Patient Active Problem List    Diagnosis Date Noted   • DKA, type 1 ([address]) 2023   • HTN (hypertension) 2023   • Tachycardia 2023      LOS (days): 2  Geometric Mean LOS (GMLOS) (days):   Days to GMLOS:     OBJECTIVE:  Risk of Unplanned Readmission Score: 6.44         Current admission status: Inpatient   Preferred Pharmacy:   PATIENT/FAMILY REPORTS NO PREFERRED PHARMACY  No address on file      Primary Care Provider: No primary care provider on file. Primary Insurance: [employer]  Secondary Insurance:     DISCHARGE DETAILS:     CM notified by RN that patient needs assistance with diabetic meds because he was using OTC insulin. CM met with patient and SO  At bedside. Patient stated that he was using Relion from [facility] [address] WESLEY PAULINO. Patient said that he had to do that because of the insurance he previously had. Now that he has [employer], insulins maybe more affordable. CM price checked quick pens at [employer] on [facility] and both humalog and basaglar are $70. However, patient would like vials. Patient was able to research on  [employer] website and believes that meds should not be expensive. [facility] will print out insulin scripts so that patient can take to pharmacy of choice.

## 2023-10-01 NOTE — ASSESSMENT & PLAN NOTE
Lab Results   Component Value Date    HGBA1C 9.4 (H) 09/30/2023       Recent Labs     09/30/23  1226 09/30/23  1615 09/30/23  2143 10/01/23  0634   POCGLU 250* 275* 212* 328*       Blood Sugar Average: Last 72 hrs:  (P) [id number]

## 2023-10-01 NOTE — PLAN OF CARE
Problem: METABOLIC, FLUID AND ELECTROLYTES - ADULT  Goal: Glucose maintained within target range  Description: INTERVENTIONS:  - Monitor Blood Glucose as ordered  - Assess for signs and symptoms of hyperglycemia and hypoglycemia  - Administer ordered medications to maintain glucose within target range  - Assess nutritional intake and initiate nutrition service referral as needed  Outcome: Progressing     Problem: Knowledge Deficit  Goal: Patient/family/caregiver demonstrates understanding of disease process, treatment plan, medications, and discharge instructions  Description: Complete learning assessment and assess knowledge base.   Interventions:  - Provide teaching at level of understanding  - Provide teaching via preferred learning methods  Outcome: Progressing

## 2023-10-01 NOTE — DISCHARGE SUMMARY
Discharge Summary - Jackie Solomon 25 y.o. male MRN: [id number]    Unit/Bed#: E2 -28 Encounter: [id number]    Admission Date:   Admission Orders (From admission, onward)     Ordered        09/29/23 1944  Inpatient Admission  Once                        Admitting Diagnosis: DKA (diabetic ketoacidosis) ([address] [facility]) [E11.10]    HPI: "Jackie Solomon is a 25 y.o. with a PMHx of DMI who presents as a transfer from [facility] with labs consistent with DKA. Pt states that he was monitoring his BG at home and noticed that it kept increasing despite giving himself correction doses of SQ insulin. He denies recent illness or being in contact with ill persons. He states that he presented to the ED because of ongoing nausea and vomiting with oral intake. He reports excessive thirst, but denies polyuria. He denies diarrhea or feeling febrile. The patient states he has not been following an Endocrinologist regularly for 4 years and has used the same insulin regimen for the last 4 years without adjustments. The pt states he buys his insulin OTC at [facility]. Upon arrival to [address] [place] [address] ICU, the pt was tachycardic with HR int he 130s and was given a 1L isolyte bolus of fluid. The patient's blood glucose was 250 and D5LR @250cc/hr started with continuation of regular insulin infusion."    Procedures Performed: No orders of the defined types were placed in this encounter. Summary of Hospital Course: Patient treated for DKA in the ICU; received insulin drip as well as IV fluid resuscitation. Patient's blood glucose levels improved and patient was discharged to the floors on 60 units long-acting insulin nightly and 10 units short acting insulin 3 times daily with meals. Patient reports not seeing a doctor for approximately 5 years and states that he was largely noncompliant with his insulin until about a month or 2 ago when he began taking it regularly.   He states that his blood glucose levels at home are much better controlled and that he anticipates his A1c will go down significantly on future labs. Patient reports that he intends to return to his 35 units long-acting insulin twice daily and sliding scale insulin for meals. Prescription given for insulin and patient referred to endocrinology. States that he recently set up primary care and will follow-up with his PCP in 1 week. Patient also understands his blood pressures are high. Reported he will follow-up with primary care for appropriate treatment. Significant Findings, Care, Treatment and Services Provided:   DKA  Hypertension    Complications: None    Discharge Diagnosis: DKA    Medical Problems     Resolved Problems  Date Reviewed: 9/30/2023          Resolved    Hyperkalemia 9/30/2023     Resolved by  JORJE Street          Condition at Discharge: stable         Discharge instructions/Information to patient and family:   See after visit summary for information provided to patient and family. Provisions for Follow-Up Care:  See after visit summary for information related to follow-up care and any pertinent home health orders. PCP: No primary care provider on file. Disposition: Home    Planned Readmission: No      Discharge Statement   I spent 45 minutes discharging the patient. This time was spent on the day of discharge. I had direct contact with the patient on the day of discharge. Additional documentation is required if more than 30 minutes were spent on discharge. Discharge Medications:  See after visit summary for reconciled discharge medications provided to patient and family.

## 2023-10-02 NOTE — UTILIZATION REVIEW
NOTIFICATION OF ADMISSION DISCHARGE   This is a Notification of Discharge from [facility]. Please be advised that this patient has been discharge from our facility. Below you will find the admission and discharge date and time including the patient’s disposition. UTILIZATION REVIEW CONTACT:  Dhaval Valerio  Utilization   Network Utilization Review Department  Phone: [phone] x carefully listen to the prompts. All voicemails are confidential.  Email: [email or web address]     ADMISSION INFORMATION  PRESENTATION DATE: 9/29/2023  7:42 PM  OBERVATION ADMISSION DATE:   INPATIENT ADMISSION DATE: 9/29/23  7:42 PM   DISCHARGE DATE: 10/1/2023  4:05 PM   DISPOSITION:Home/Self Care    IMPORTANT INFORMATION:  Send all requests for admission clinical reviews, approved or denied determinations and any other requests to dedicated fax number below belonging to the campus where the patient is receiving treatment.  List of dedicated fax numbers:  [place] DENIALS (Administrative/Medical Necessity) [phone] [facility] [facility] (Maternity/NICU/Pediatrics) [phone]   [facility] [phone]   [facility] [phone] [address] [phone]   [address] [place] [address] [phone]   [facility] [phone]   [address] [place] [address] [address] [place] [address] [phone]   [address] [place] [address] [phone]   [address] [facility] [phone]   [address] [facility] [address] [facility] [phone]

## 2023-10-02 NOTE — UTILIZATION REVIEW
Notification of Unplanned, Urgent, or   Emergency Inpatient Admission   [address] [facility], [address]  Tax ID: [id number]  NPI: [id number]  Place of Service: [address]  Admission Level of Care: Inpatient  Place of Service Code: 21     Attending Physician Information  Attending Name and NPI#: Sharon Lockhart [place] [[id number]]  Phone: [phone]     Admission Information  Inpatient Admission Date/Time: 9/29/23  7:42 PM  Discharge Date/Time: 10/1/2023  4:05 PM  Admitting Diagnosis Code/Description:  DKA (diabetic ketoacidosis) ([address] ) [E11.10]     Utilization Review [address] [place], Utilization   Phone: [phone]  Fax: [phone]  Email: [email or web address]  Contact for approvals/pending authorizations, clinical reviews, and discharge. Physician Advisory Services Contact  Medical Necessity Denial & Vlad-ff-Rjnp Discussion  Phone: [phone]  Fax: [phone]  Email: [email or web address]

## 2023-10-02 NOTE — UTILIZATION REVIEW
Initial Clinical Review    TRANSFER FROM [place]  ED    Admission: Date/Time/Statement:   Admission Orders (From admission, onward)     Ordered        09/29/23 1944  Inpatient Admission  Once                      Orders Placed This Encounter   Procedures   • Inpatient Admission     Standing Status:   Standing     Number of Occurrences:   1     Order Specific Question:   Level of Care     Answer:   Critical Care [15]     Order Specific Question:   Estimated length of stay     Answer:   More than 2 Midnights     Order Specific Question:   Certification     Answer:   I certify that inpatient services are medically necessary for this patient for a duration of greater than two midnights. See H&P and MD Progress Notes for additional information about the patient's course of treatment. Initial Presentation: 25 y.o. male initially presented to ED at  [facility] with increasing blood sugars at home despite self adjusting insulin doses. Has nausea and vomiting with any po intake. Has excessive thirst,  No polyuria. Has not seen endocrine in 4 years, has been on the same insulin regimen  For 4 years without adjustments. Buys  Insulin  OTC at [facility]. PMH  DM 1. Labs  Reveal  K   5.4  Sodium  129,  CO2  9,   BH  4.8,  VBG  7.20/26/87/10.1 and U/A   >  1,000  Glucose  And + 3 ketone. Tachycardic on ED arrival and given  1 L  IVF  Bolus. Blood sugar  425. And started insulin drip. Transferred to [facility] for further care. Admit  Ip,  ICU  LOC,  with   DKA, Tachycardia and Hyperkalemia and plan is  IVF< insulin drip, monitor labs, endocrine consult, strict  I & O, NPO  BS  q 1 hour. Tele  And replace electrolytes. Date: 9/30       Day 2:   Transferred to med surg  Floor. IVF/insuin  Drip d/c. Feels  Improved. Diet advance  To clear liquid. Tachycardia improved, received  2 L  isolyte overnight. Remain on tele. Continue insulin and monitor  BS.      Wt Readings from Last 1 Encounters: 09/30/23 124 kg (272 lb 11.3 oz)     Additional Vital Signs:   C) 88 20 -- -- -- -- --    09/30/23 1620 -- 104 20 146/85 103 95 % -- --   09/30/23 1439 98.8 °F (37.1 °C) -- -- -- -- -- -- --   09/30/23 1225 -- 96 16 119/79 97 97 % None (Room air) --   09/30/23 1136 98.1 °F (36.7 °C) -- -- -- -- -- -- --   09/30/23 0915 -- 106 Abnormal  23 Abnormal  134/84 97 97 % -- --   09/30/23 0707 97.5 °F (36.4 °C) -- -- -- -- -- -- --   09/30/23 0628 -- 96 14 143/79 101 97 % -- --   09/30/23 0403 97.5 °F (36.4 °C) 92 8 Abnormal  122/66 88 100 % None (Room air) Lying   09/30/23 0300 -- 96 14 128/69 93 98 % -- --   09/30/23 0200 -- 106 Abnormal  14 121/72 88 95 % -- --   09/30/23 0140 -- 106 Abnormal  14 122/70 90 96 % -- --   09/30/23 0000 98.7 °F (37.1 °C) 104 18 133/65 89 97 % None (Room air) Lying   09/29/23 2212 -- 110 Abnormal  12 161/64 97 99 % -- --   09/29/23 2100 -- 106 Abnormal  20 169/77 115 99 % -- --   09/29/23 2000 -- 130 Abnormal  32 Abnormal  153/83 107 99 % -- --   09/29/23 1950 -- 128 Abnormal  22 166/88 109 98 % -- --   09/29/23 1942 98.4 °F (36.9 °C) 122 Abnormal  20 -- -- 100 % None (Room air)        Pertinent Labs/Diagnostic Test Results:   EKG  ST  No P wave or  ST changes    Results from last 7 days   Lab Units 09/29/23  1501   SARS-COV-2  Negative     Results from last 7 days   Lab Units 09/30/23  0451 09/29/23  2113 09/29/23  1501   WBC Thousand/uL 12.90* 15.69* 15.70*   HEMOGLOBIN g/dL 13.6 14.9 17.3*   HEMATOCRIT % 40.3 45.6 51.0*   PLATELETS Thousands/uL 229 238 270   NEUTROS ABS Thousands/µL 7.99* 12.30*  --          Results from last 7 days   Lab Units 09/30/23  1242 09/30/23  0350 09/30/23  0033 09/29/23 2113 09/29/23  1859 09/29/23  1710   SODIUM mmol/L 132* 137 134* 134* 136 131*   POTASSIUM mmol/L 3.9 3.9 5.0 5.5* 4.6 5.4*   CHLORIDE mmol/L 102 107 105 103 102 93*   CO2 mmol/L 21 22 17* 13* 14* 11*   ANION GAP mmol/L 9 8 12 18 20 27   BUN mg/dL 21 21 22 26* 27* 30*   CREATININE mg/dL 0.81 0. 86 0.93 1.12 1.15 1.37*   EGFR ml/min/1.73sq m 119 116 109 87 84 68   CALCIUM mg/dL 8.6 8.5 8.3* 8.7 9.2 9.9   CALCIUM, IONIZED mmol/L  --   --   --  1.14  --   --    MAGNESIUM mg/dL  --  2.0 2.2 2.3 2.3 2.3   PHOSPHORUS mg/dL  --  3.0 2.5* 3.8 4.5 5.7*     Results from last 7 days   Lab Units 09/29/23  1501   AST U/L 11*   ALT U/L 14   ALK PHOS U/L 125*   TOTAL PROTEIN g/dL 9.6*   ALBUMIN g/dL 5.4*   TOTAL BILIRUBIN mg/dL 0.57     Results from last 7 days   Lab Units 10/01/23  1112 10/01/23  0634 09/30/23  2143 09/30/23  1615 09/30/23  1226 09/30/23  1004 09/30/23  0819 09/30/23  0706 09/30/23  0556 09/30/23  0500 09/30/23  0346 09/30/23  0307   POC GLUCOSE mg/dl 241* 328* 212* 275* 250* 271* 195* 181* 102 80 122 151*     Results from last 7 days   Lab Units 09/30/23  1242 09/30/23  0350 09/30/23  0033 09/29/23  2113 09/29/23  1859 09/29/23  1710 09/29/23  1501   GLUCOSE RANDOM mg/dL 291* 129 205* 273* 272* 472* 526*         Results from last 7 days   Lab Units 09/30/23  0451   HEMOGLOBIN A1C % 9.4*   EAG mg/dl 223     BETA-HYDROXYBUTYRATE   Date Value Ref Range Status   09/29/2023 4.8 (H) <0.6 mmol/L Final          Results from last 7 days   Lab Units 09/30/23  0038 09/29/23  2113 09/29/23  1501   PH NED  7.315 7.212* 7.203*   PCO2 NED mm Hg 37.4* 34.9* 26.3*   PO2 NED mm Hg 63.7* 38.7 87.3*   HCO3 NED mmol/L 18.6* 13.7* 10.1*   BASE EXC NED mmol/L -6.8 -13.1 -15.9   O2 CONTENT NED ml/dL 18.6 15.2 23.8   O2 HGB, VENOUS % 90.9* 68.8 94.0*               Results from last 7 days   Lab Units 09/29/23  1501   LIPASE u/L 8*                 Results from last 7 days   Lab Units 09/29/23  1603   CLARITY UA  Clear   COLOR UA  Straw   SPEC GRAV UA  1.020   PH UA  5.0   GLUCOSE UA mg/dl >=1000 (1%)*   KETONES UA mg/dl >=150 (3+)*   BLOOD UA  Negative   PROTEIN UA mg/dl 30 (1+)*   NITRITE UA  Negative   BILIRUBIN UA  Negative   UROBILINOGEN UA mg/dL Negative   LEUKOCYTES UA  Negative   WBC UA /hpf None Seen   RBC UA /hpf None Seen   BACTERIA UA /hpf None Seen   EPITHELIAL CELLS WET PREP /hpf Occasional   MUCUS THREADS  Occasional*     Results from last 7 days   Lab Units 09/29/23  1501   INFLUENZA A PCR  Negative   INFLUENZA B PCR  Negative   RSV PCR  Negative                                     Present on Admission:  • DKA, type 1 ([address])  • HTN (hypertension)  • Tachycardia      Admitting Diagnosis: DKA (diabetic ketoacidosis) ([address]) [E11.10]  Age/Sex: 25 y.o. male  Admission Orders:  Scheduled Medications:  Insulin QID  IV  Labetalol  Q 6 hrs - d/c  9/29        Continuous IV Infusions:  Insulin drip - d/c  9/30  IVF   250/hr - d/c   9/30          PRN Meds:    IP CONSULT TO CASE MANAGEMENT    Network Utilization Review Department  ATTENTION: Please call with any questions or concerns to [phone] and carefully listen to the prompts so that you are directed to the right person. All voicemails are confidential.  Haydee Perry all requests for admission clinical reviews, approved or denied determinations and any other requests to dedicated fax number below belonging to the campus where the patient is receiving treatment.  List of dedicated fax numbers for the Facilities:  [place] DENIALS (Administrative/Medical Necessity) [phone]   [facility] (Maternity/NICU/Pediatrics) [phone]   [address] [place] [address] Drive [phone]   [facility] [address] [facility] [phone]   [address] [facility] [address] [facility] [address] [address] [facility] [phone]   [address] [facility] [address] [place] [address] [phone]

## 2023-10-09 ENCOUNTER — HOSPITAL ENCOUNTER (EMERGENCY)
Facility: HOSPITAL | Age: 28
Discharge: HOME | End: 2023-10-10
Attending: EMERGENCY MEDICINE
Payer: COMMERCIAL

## 2023-10-09 ENCOUNTER — APPOINTMENT (EMERGENCY)
Dept: RADIOLOGY | Facility: HOSPITAL | Age: 28
End: 2023-10-09
Payer: COMMERCIAL

## 2023-10-09 DIAGNOSIS — R73.9 HYPERGLYCEMIA: Primary | ICD-10-CM

## 2023-10-09 DIAGNOSIS — R00.0 TACHYCARDIA: ICD-10-CM

## 2023-10-09 LAB
ALBUMIN SERPL BCP-MCNC: 4.9 G/DL (ref 3.5–5)
ALP SERPL-CCNC: 98 U/L (ref 34–104)
ALT SERPL W P-5'-P-CCNC: 10 U/L (ref 7–52)
ANION GAP SERPL CALCULATED.3IONS-SCNC: 8 MMOL/L
AST SERPL W P-5'-P-CCNC: 10 U/L (ref 13–39)
BASOPHILS # BLD AUTO: 0.09 THOUSANDS/ÂΜL (ref 0–0.1)
BASOPHILS NFR BLD AUTO: 1 % (ref 0–1)
BETA-HYDROXYBUTYRATE: 0.2 MMOL/L
BILIRUB SERPL-MCNC: 0.41 MG/DL (ref 0.2–1)
BILIRUB UR QL STRIP: NEGATIVE
BUN SERPL-MCNC: 22 MG/DL (ref 5–25)
CALCIUM SERPL-MCNC: 10.3 MG/DL (ref 8.4–10.2)
CHLORIDE SERPL-SCNC: 92 MMOL/L (ref 96–108)
CLARITY UR: CLEAR
CO2 SERPL-SCNC: 29 MMOL/L (ref 21–32)
COLOR UR: ABNORMAL
CREAT SERPL-MCNC: 0.94 MG/DL (ref 0.6–1.3)
EOSINOPHIL # BLD AUTO: 0.14 THOUSAND/ÂΜL (ref 0–0.61)
EOSINOPHIL NFR BLD AUTO: 1 % (ref 0–6)
ERYTHROCYTE [DISTWIDTH] IN BLOOD BY AUTOMATED COUNT: 12.2 % (ref 11.6–15.1)
GFR SERPL CREATININE-BSD FRML MDRD: 108 ML/MIN/1.73SQ M
GLUCOSE SERPL-MCNC: 322 MG/DL (ref 65–140)
GLUCOSE SERPL-MCNC: 351 MG/DL (ref 65–140)
GLUCOSE UR STRIP-MCNC: ABNORMAL MG/DL
HCT VFR BLD AUTO: 47 % (ref 36.5–49.3)
HGB BLD-MCNC: 15.9 G/DL (ref 12–17)
HGB UR QL STRIP.AUTO: NEGATIVE
IMM GRANULOCYTES # BLD AUTO: 0.05 THOUSAND/UL (ref 0–0.2)
IMM GRANULOCYTES NFR BLD AUTO: 1 % (ref 0–2)
KETONES UR STRIP-MCNC: NEGATIVE MG/DL
LEUKOCYTE ESTERASE UR QL STRIP: NEGATIVE
LIPASE SERPL-CCNC: 34 U/L (ref 11–82)
LYMPHOCYTES # BLD AUTO: 4.41 THOUSANDS/ÂΜL (ref 0.6–4.47)
LYMPHOCYTES NFR BLD AUTO: 42 % (ref 14–44)
MAGNESIUM SERPL-MCNC: 2.1 MG/DL (ref 1.9–2.7)
MCH RBC QN AUTO: 28 PG (ref 26.8–34.3)
MCHC RBC AUTO-ENTMCNC: 33.8 G/DL (ref 31.4–37.4)
MCV RBC AUTO: 83 FL (ref 82–98)
MONOCYTES # BLD AUTO: 0.84 THOUSAND/ÂΜL (ref 0.17–1.22)
MONOCYTES NFR BLD AUTO: 8 % (ref 4–12)
NEUTROPHILS # BLD AUTO: 5.04 THOUSANDS/ÂΜL (ref 1.85–7.62)
NEUTS SEG NFR BLD AUTO: 47 % (ref 43–75)
NITRITE UR QL STRIP: NEGATIVE
NRBC BLD AUTO-RTO: 0 /100 WBCS
PH UR STRIP.AUTO: 6 [PH]
PLATELET # BLD AUTO: 289 THOUSANDS/UL (ref 149–390)
PMV BLD AUTO: 10.7 FL (ref 8.9–12.7)
POTASSIUM SERPL-SCNC: 4.1 MMOL/L (ref 3.5–5.3)
PROT SERPL-MCNC: 8.2 G/DL (ref 6.4–8.4)
PROT UR STRIP-MCNC: NEGATIVE MG/DL
RBC # BLD AUTO: 5.67 MILLION/UL (ref 3.88–5.62)
SODIUM SERPL-SCNC: 129 MMOL/L (ref 135–147)
SP GR UR STRIP.AUTO: 1.01 (ref 1–1.04)
UROBILINOGEN UA: NEGATIVE MG/DL
WBC # BLD AUTO: 10.57 THOUSAND/UL (ref 4.31–10.16)

## 2023-10-09 PROCEDURE — 83690 ASSAY OF LIPASE: CPT

## 2023-10-09 PROCEDURE — 82010 KETONE BODYS QUAN: CPT

## 2023-10-09 PROCEDURE — 83735 ASSAY OF MAGNESIUM: CPT

## 2023-10-09 PROCEDURE — 80053 COMPREHEN METABOLIC PANEL: CPT

## 2023-10-09 PROCEDURE — 81003 URINALYSIS AUTO W/O SCOPE: CPT

## 2023-10-09 PROCEDURE — 36415 COLL VENOUS BLD VENIPUNCTURE: CPT

## 2023-10-09 PROCEDURE — 82948 REAGENT STRIP/BLOOD GLUCOSE: CPT

## 2023-10-09 PROCEDURE — 71046 X-RAY EXAM CHEST 2 VIEWS: CPT

## 2023-10-09 PROCEDURE — 99285 EMERGENCY DEPT VISIT HI MDM: CPT | Performed by: EMERGENCY MEDICINE

## 2023-10-09 PROCEDURE — 96360 HYDRATION IV INFUSION INIT: CPT

## 2023-10-09 PROCEDURE — 85025 COMPLETE CBC W/AUTO DIFF WBC: CPT

## 2023-10-09 PROCEDURE — 99284 EMERGENCY DEPT VISIT MOD MDM: CPT

## 2023-10-09 PROCEDURE — 93005 ELECTROCARDIOGRAM TRACING: CPT

## 2023-10-09 PROCEDURE — 81001 URINALYSIS AUTO W/SCOPE: CPT

## 2023-10-09 RX ORDER — LISINOPRIL 10 MG/1
10 TABLET ORAL DAILY
COMMUNITY

## 2023-10-09 RX ADMIN — SODIUM CHLORIDE 2000 ML: 0.9 INJECTION, SOLUTION INTRAVENOUS at 23:13

## 2023-10-09 NOTE — Clinical Note
Nicolle Mascorro was seen and treated in our emergency department on 10/9/2023. No restrictions            Diagnosis:     [place]  . He may return on this date: 10/12/2023         If you have any questions or concerns, please don't hesitate to call.       Qasim Calderon MD    ______________________________           _______________          _______________  Hospital Representative                              Date                                Time

## 2023-10-10 VITALS
BODY MASS INDEX: 35.54 KG/M2 | RESPIRATION RATE: 18 BRPM | HEART RATE: 99 BPM | SYSTOLIC BLOOD PRESSURE: 150 MMHG | OXYGEN SATURATION: 99 % | TEMPERATURE: 96.8 F | WEIGHT: 276.8 LBS | DIASTOLIC BLOOD PRESSURE: 88 MMHG

## 2023-10-10 LAB
ATRIAL RATE: 121 BPM
BACTERIA UR QL AUTO: NORMAL /HPF
NON-SQ EPI CELLS URNS QL MICRO: NORMAL /HPF
P AXIS: 38 DEGREES
PR INTERVAL: 160 MS
QRS AXIS: 22 DEGREES
QRSD INTERVAL: 86 MS
QT INTERVAL: 312 MS
QTC INTERVAL: 443 MS
RBC #/AREA URNS AUTO: NORMAL /HPF
T WAVE AXIS: 23 DEGREES
VENTRICULAR RATE: 121 BPM
WBC #/AREA URNS AUTO: NORMAL /HPF

## 2023-10-10 PROCEDURE — 93010 ELECTROCARDIOGRAM REPORT: CPT | Performed by: INTERNAL MEDICINE

## 2023-10-10 NOTE — ED ATTENDING ATTESTATION
10/9/2023  Nicole Bojorquez DO, saw and evaluated the patient. I have discussed the patient with the resident/non-physician practitioner and agree with the resident's/non-physician practitioner's findings, Plan of Care, and MDM as documented in the resident's/non-physician practitioner's note, except where noted. All available labs and Radiology studies were reviewed. I was present for key portions of any procedure(s) performed by the resident/non-physician practitioner and I was immediately available to provide assistance. At this point I agree with the current assessment done in the Emergency Department. I have conducted an independent evaluation of this patient a history and physical is as follows:    26-year-old gentleman presents with uncontrolled blood sugar. He was admitted to the ICU a couple weeks ago and has since then been watching his diet and taking his insulin as prescribed. He reports that the Levemir has been helping his blood sugar but he has seen little to no change when taking the Humalog. He had 1 episode of nausea and vomiting earlier today but that is since resolved. Otherwise he has no acute symptoms consistent with hyperglycemia or DKA. He is in the process of establishing with a new primary care provider and endocrinologist next month. Patient also reports that he was recently restarted on lisinopril 10 mg daily. He has had no chest pain, difficulty breathing, headaches, or other symptoms consistent with hypertensive urgency or emergency. Work-up is unremarkable. Will discharge home with close outpatient follow-up.     ED Course         Critical Care Time  Procedures

## 2023-10-10 NOTE — DISCHARGE INSTRUCTIONS
-Follow-up with your primary care physician as soon as possible for reevaluation and medication changes for your diabetes. -Please continue a healthy diet, exercise, plenty of fluids.  -Return for reevaluation for continued or worsening symptoms.

## 2023-10-10 NOTE — ED PROVIDER NOTES
History  Chief Complaint   Patient presents with   • Hyperglycemia - no symptoms     Patient checking sugars at home and are continuing to rise - compliant with medications (Humalog and Levamir) - one episode of vomiting - no symptoms at this time      23-year-old male with history of diabetes presents with elevated blood sugars. Today states 1 episode of nausea with vomiting and uptrending blood sugars despite home insulin regimen. He states that he is on sliding scale Humalog and Levemir. Notes no change in blood glucose when taking his Humalog, sugars in the 500s today, but has noted downtrending sugar levels when taking his Levemir but only last approximately 6 to 8 hours. Has follow-up with endocrinology on November 8. Compliant with medications. No other complaints. Has been incorporating change in diet and healthier eating. Recent admission for DKA 2 weeks ago where he was noted to have nausea and vomiting with elevated blood sugars and tachycardia. Denies fevers, chills, chest pain, shortness of breath, abdominal pain, dysuria, frequency, urgency, genital rash/lesions, rash, skin infection, diaphoresis, polyphagia, polyuria. Prior to Admission Medications   Prescriptions Last Dose Informant Patient Reported? Taking?   insulin glargine (LANTUS) 100 units/mL subcutaneous injection   No No   Sig: Inject 35 Units under the skin every 12 (twelve) hours   insulin lispro (HumaLOG) 100 units/mL injection   No No   Sig: Inject 10 Units under the skin 3 (three) times a day with meals   lisinopril (ZESTRIL) 10 mg tablet   Yes Yes   Sig: Take 10 mg by mouth daily      Facility-Administered Medications: None       Past Medical History:   Diagnosis Date   • Diabetes mellitus ([address])        Past Surgical History:   Procedure Laterality Date   • SINUS SURGERY         History reviewed. No pertinent family history. I have reviewed and agree with the history as documented.     E-Cigarette/Vaping   • E-Cigarette Use Never User      E-Cigarette/Vaping Substances   • Nicotine No    • THC No    • CBD No    • Flavoring No      Social History     Tobacco Use   • Smoking status: Never   • Smokeless tobacco: Never   Vaping Use   • Vaping Use: Never used   Substance Use Topics   • Alcohol use: Not Currently        Review of Systems   All other systems reviewed and are negative. Physical Exam  ED Triage Vitals [10/09/23 2254]   Temperature Pulse Respirations Blood Pressure SpO2   (!) 96.8 °F (36 °C) (!) 127 18 (!) 180/101 100 %      Temp Source Heart Rate Source Patient Position - Orthostatic VS BP Location FiO2 (%)   Tympanic Monitor Sitting Left arm --      Pain Score       --             Orthostatic Vital Signs  Vitals:    10/09/23 2254 10/09/23 2330 10/10/23 0000   BP: (!) 180/101 151/100 150/88   Pulse: (!) 127 (!) 106 99   Patient Position - Orthostatic VS: Sitting Sitting Lying       Physical Exam  Vitals and nursing note reviewed. Constitutional:       General: He is not in acute distress. Appearance: Normal appearance. He is normal weight. He is not ill-appearing, toxic-appearing or diaphoretic. HENT:      Head: Normocephalic and atraumatic. Right Ear: External ear normal.      Left Ear: External ear normal.      Nose: Nose normal.      Mouth/Throat:      Mouth: Mucous membranes are moist.      Pharynx: Oropharynx is clear. Eyes:      General: No scleral icterus. Right eye: No discharge. Left eye: No discharge. Extraocular Movements: Extraocular movements intact. Cardiovascular:      Rate and Rhythm: Regular rhythm. Tachycardia present. Pulses: Normal pulses. Heart sounds: Normal heart sounds. No murmur heard. No friction rub. No gallop. Pulmonary:      Effort: Pulmonary effort is normal. No respiratory distress. Breath sounds: Normal breath sounds. No stridor. No wheezing, rhonchi or rales. Chest:      Chest wall: No tenderness.    Abdominal:      General: There is no distension. Palpations: Abdomen is soft. There is no mass. Tenderness: There is no abdominal tenderness. There is no right CVA tenderness, left CVA tenderness, guarding or rebound. Hernia: No hernia is present. Musculoskeletal:         General: No swelling, tenderness, deformity or signs of injury. Normal range of motion. Cervical back: Neck supple. Right lower leg: No edema. Left lower leg: No edema. Skin:     General: Skin is warm and dry. Capillary Refill: Capillary refill takes less than 2 seconds. Coloration: Skin is not cyanotic, jaundiced or pale. Findings: No bruising, erythema, lesion, petechiae or rash. Comments: Normal skin turgor   Neurological:      General: No focal deficit present. Mental Status: He is alert and oriented to person, place, and time. Mental status is at baseline.    Psychiatric:         Mood and Affect: Mood normal.         Behavior: Behavior normal.         ED Medications  Medications   sodium chloride 0.9 % bolus 2,000 mL (2,000 mL Intravenous New Bag 10/9/23 7282)       Diagnostic Studies  Results Reviewed     Procedure Component Value Units Date/Time    Urine Microscopic [[id number]]  (Normal) Collected: 10/09/23 2344    Lab Status: Final result Specimen: Urine, Clean Catch Updated: 10/10/23 0016     RBC, UA None Seen /hpf      WBC, UA 0-1 /hpf      Epithelial Cells Occasional /hpf      Bacteria, UA None Seen /hpf     UA w Reflex to Microscopic w Reflex to Culture [[id number]]  (Abnormal) Collected: 10/09/23 2344    Lab Status: Final result Specimen: Urine, Clean Catch Updated: 10/09/23 2354     Color, UA Straw     Clarity, UA Clear     Specific Gravity, UA 1.015     pH, UA 6.0     Leukocytes, UA Negative     Nitrite, UA Negative     Protein, UA Negative mg/dl      Glucose, UA >=1000 (1%) mg/dl      Ketones, UA Negative mg/dl      Bilirubin, UA Negative     Occult Blood, UA Negative     UROBILINOGEN UA Negative mg/dL     Lipase [[id number]]  (Normal) Collected: 10/09/23 2313    Lab Status: Final result Specimen: Blood from Line, Venous Updated: 10/09/23 2347     Lipase 34 u/L     Comprehensive metabolic panel [[id number]]  (Abnormal) Collected: 10/09/23 2313    Lab Status: Final result Specimen: Blood from Line, Venous Updated: 10/09/23 2347     Sodium 129 mmol/L      Potassium 4.1 mmol/L      Chloride 92 mmol/L      CO2 29 mmol/L      ANION GAP 8 mmol/L      BUN 22 mg/dL      Creatinine 0.94 mg/dL      Glucose 322 mg/dL      Calcium 10.3 mg/dL      AST 10 U/L      ALT 10 U/L      Alkaline Phosphatase 98 U/L      Total Protein 8.2 g/dL      Albumin 4.9 g/dL      Total Bilirubin 0.41 mg/dL      eGFR 108 ml/min/1.73sq m     Narrative:      [facility] (CKD):   •  Stage 1 with normal or high GFR (GFR > 90 mL/min/1.73 square meters)  •  Stage 2 Mild CKD (GFR = 60-89 mL/min/1.73 square meters)  •  Stage 3A Moderate CKD (GFR = 45-59 mL/min/1.73 square meters)  •  Stage 3B Moderate CKD (GFR = 30-44 mL/min/1.73 square meters)  •  Stage 4 Severe CKD (GFR = 15-29 mL/min/1.73 square meters)  •  Stage 5 End Stage CKD (GFR <15 mL/min/1.73 square meters)  Note: GFR calculation is accurate only with a steady state creatinine    Magnesium [[id number]]  (Normal) Collected: 10/09/23 2313    Lab Status: Final result Specimen: Blood from Line, Venous Updated: 10/09/23 2347     Magnesium 2.1 mg/dL     Beta Hydroxybutyrate [[id number]]  (Normal) Collected: 10/09/23 2313    Lab Status: Final result Specimen: Blood from Line, Venous Updated: 10/09/23 2338     BETA-HYDROXYBUTYRATE 0.2 mmol/L     CBC and differential [[id number]]  (Abnormal) Collected: 10/09/23 2313    Lab Status: Final result Specimen: Blood from Line, Venous Updated: 10/09/23 2326     WBC 10.57 Thousand/uL      RBC 5.67 Million/uL      Hemoglobin 15.9 g/dL      Hematocrit 47.0 %      MCV 83 fL      MCH 28.0 pg      MCHC 33.8 g/dL      RDW 12.2 %      MPV 10.7 fL      Platelets 069 Thousands/uL      nRBC 0 /100 WBCs      Neutrophils Relative 47 %      Immat GRANS % 1 %      Lymphocytes Relative 42 %      Monocytes Relative 8 %      Eosinophils Relative 1 %      Basophils Relative 1 %      Neutrophils Absolute 5.04 Thousands/µL      Immature Grans Absolute 0.05 Thousand/uL      Lymphocytes Absolute 4.41 Thousands/µL      Monocytes Absolute 0.84 Thousand/µL      Eosinophils Absolute 0.14 Thousand/µL      Basophils Absolute 0.09 Thousands/µL     Fingerstick Glucose (POCT) [[id number]]  (Abnormal) Collected: 10/09/23 2256    Lab Status: Final result Updated: 10/09/23 2257     POC Glucose 351 mg/dl                  XR chest 2 views   ED Interpretation by Hayde Varela MD (10/10 0019)   No acute cardiopulmonary process noted. Procedures  Procedures      ED Course  ED Course as of 10/10/23 0029   Mon Oct 09, 2023   2259 POC Glucose(!): 351   2347 Anion Gap: 8   2347 Corrected sodium of 131   Tue Oct 10, 2023   0020 EKG personally interpreted by me, sinus tachycardia rate of 121, normal VT interval, normal QRS interval, QTc 443, normal axis, no ST elevations, no ST depressions, no STEMI. SBIRT 20yo+    Flowsheet Row Most Recent Value   Initial Alcohol Screen: US AUDIT-C     1. How often do you have a drink containing alcohol? 2 Filed at: 10/09/2023 2300   2. How many drinks containing alcohol do you have on a typical day you are drinking? 0 Filed at: 10/09/2023 2300   3a. Male UNDER 65: How often do you have five or more drinks on one occasion? 0 Filed at: 10/09/2023 2300   Audit-C Score 2 Filed at: 10/09/2023 2300   ALLISON: How many times in the past year have you. .. Used an illegal drug or used a prescription medication for non-medical reasons?  Never Filed at: 10/09/2023 2300                Medical Decision Making  43-year-old male with history of diabetes presents with hyperglycemia and concern of DKA.  Previous history of DKA. No acute distress, tachycardic, normal gait, no respiratory distress, no other complaints at this time. Differential includes but not limited to DKA, pneumonia, UTI, electrolyte abnormality, inadequate medication dosing, inadequate medication regimen, defective batch of medication. Work-up for DKA, POC glucose 351, no change in anion gap, normal beta hydroxybutyrate, glucosuria. No DKA. No consolidations or acute cardiopulmonary process on chest x-ray. EKG normal sinus rhythm with no ischemic changes, normal QT, no peaked T waves. Unlikely infectious process at this time. Tachycardia improving after 1.5 L. Advised to follow-up with primary care physician as soon as possible for possible medication changes and continued monitoring of blood glucose. Follow-up with endocrinology as soon as possible for possible insulin pump. Continue diet, exercise, medication regimen. Drink plenty of fluids. Discharged home to self-care. Strict return precautions for continued or worsening symptoms, signs and symptoms of infection, signs and symptoms of DKA. Patient understanding and agreement with plan. Amount and/or Complexity of Data Reviewed  Labs: ordered. Decision-making details documented in ED Course. Radiology: ordered and independent interpretation performed. Disposition  Final diagnoses:   Hyperglycemia   Tachycardia     Time reflects when diagnosis was documented in both MDM as applicable and the Disposition within this note     Time User Action Codes Description Comment    10/10/2023 12:15 AM Felice Bob Add [R73.9] Hyperglycemia     10/10/2023 12:16 AM [place] Bob Add [R00.0] Tachycardia       ED Disposition     ED Disposition   Discharge    Condition   Stable    Date/Time   Tue Oct 10, 2023 12:15 AM    Comment   Yen Lynch discharge to home/self care.                Follow-up Information     Follow up With Specialties Details Why Contact Info Additional Information    Nica Mancilla,  Family Medicine Schedule an appointment as soon as possible for a visit today  [address] [place] [address] [facility] Emergency Department Emergency Medicine Go to  If symptoms worsen [address] [place] [address] [id number] [address] [facility] Emergency Department          Patient's Medications   Discharge Prescriptions    No medications on file     No discharge procedures on file. PDMP Review     None           ED Provider  Attending physically available and evaluated Angelika Armando. I managed the patient along with the ED Attending.     Electronically Signed by         Qasim Calderon MD  10/10/23 3415

## 2023-11-06 ENCOUNTER — PATIENT OUTREACH (OUTPATIENT)
Dept: ENDOCRINOLOGY | Facility: HOSPITAL | Age: 28
End: 2023-11-06

## 2023-11-06 ENCOUNTER — CONSULT (OUTPATIENT)
Dept: ENDOCRINOLOGY | Facility: HOSPITAL | Age: 28
End: 2023-11-06
Payer: COMMERCIAL

## 2023-11-06 ENCOUNTER — OFFICE VISIT (OUTPATIENT)
Dept: FAMILY MEDICINE CLINIC | Facility: HOSPITAL | Age: 28
End: 2023-11-06
Payer: COMMERCIAL

## 2023-11-06 ENCOUNTER — DOCUMENTATION (OUTPATIENT)
Dept: ENDOCRINOLOGY | Facility: HOSPITAL | Age: 28
End: 2023-11-06

## 2023-11-06 VITALS
SYSTOLIC BLOOD PRESSURE: 128 MMHG | OXYGEN SATURATION: 98 % | WEIGHT: 282.7 LBS | DIASTOLIC BLOOD PRESSURE: 80 MMHG | BODY MASS INDEX: 36.28 KG/M2 | HEIGHT: 74 IN | HEART RATE: 101 BPM

## 2023-11-06 VITALS
BODY MASS INDEX: 36.37 KG/M2 | HEART RATE: 88 BPM | TEMPERATURE: 98.8 F | WEIGHT: 283.4 LBS | DIASTOLIC BLOOD PRESSURE: 80 MMHG | HEIGHT: 74 IN | SYSTOLIC BLOOD PRESSURE: 126 MMHG

## 2023-11-06 DIAGNOSIS — Z11.59 NEED FOR HEPATITIS C SCREENING TEST: ICD-10-CM

## 2023-11-06 DIAGNOSIS — I10 PRIMARY HYPERTENSION: ICD-10-CM

## 2023-11-06 DIAGNOSIS — I10 PRIMARY HYPERTENSION: Primary | ICD-10-CM

## 2023-11-06 DIAGNOSIS — E10.10 TYPE 1 DIABETES MELLITUS WITH KETOACIDOSIS WITHOUT COMA (HCC): ICD-10-CM

## 2023-11-06 DIAGNOSIS — Z23 ENCOUNTER FOR IMMUNIZATION: ICD-10-CM

## 2023-11-06 DIAGNOSIS — Z11.4 ENCOUNTER FOR SCREENING FOR HIV: ICD-10-CM

## 2023-11-06 DIAGNOSIS — Z00.00 ANNUAL PHYSICAL EXAM: Primary | ICD-10-CM

## 2023-11-06 DIAGNOSIS — E10.10 DKA, TYPE 1 (HCC): ICD-10-CM

## 2023-11-06 PROCEDURE — 99203 OFFICE O/P NEW LOW 30 MIN: CPT | Performed by: NURSE PRACTITIONER

## 2023-11-06 PROCEDURE — 99204 OFFICE O/P NEW MOD 45 MIN: CPT | Performed by: STUDENT IN AN ORGANIZED HEALTH CARE EDUCATION/TRAINING PROGRAM

## 2023-11-06 PROCEDURE — 90471 IMMUNIZATION ADMIN: CPT

## 2023-11-06 PROCEDURE — 95251 CONT GLUC MNTR ANALYSIS I&R: CPT | Performed by: STUDENT IN AN ORGANIZED HEALTH CARE EDUCATION/TRAINING PROGRAM

## 2023-11-06 PROCEDURE — 99385 PREV VISIT NEW AGE 18-39: CPT | Performed by: NURSE PRACTITIONER

## 2023-11-06 PROCEDURE — 90715 TDAP VACCINE 7 YRS/> IM: CPT

## 2023-11-06 RX ORDER — ACYCLOVIR 400 MG/1
1 TABLET ORAL DAILY
Qty: 1 EACH | Refills: 0 | Status: SHIPPED | OUTPATIENT
Start: 2023-11-06

## 2023-11-06 RX ORDER — INSULIN DETEMIR 100 [IU]/ML
INJECTION, SOLUTION SUBCUTANEOUS
COMMUNITY
End: 2023-11-06

## 2023-11-06 RX ORDER — ACYCLOVIR 400 MG/1
1 TABLET ORAL
Qty: 10 EACH | Refills: 1 | Status: SHIPPED | OUTPATIENT
Start: 2023-11-06

## 2023-11-06 RX ORDER — INSULIN DEGLUDEC INJECTION 100 U/ML
60 INJECTION, SOLUTION SUBCUTANEOUS DAILY
Qty: 15 ML | Refills: 1 | Status: SHIPPED | OUTPATIENT
Start: 2023-11-06

## 2023-11-06 RX ORDER — URINE ACETONE TEST STRIPS
1 STRIP MISCELLANEOUS AS NEEDED
Qty: 25 EACH | Refills: 0 | Status: SHIPPED | OUTPATIENT
Start: 2023-11-06

## 2023-11-06 RX ORDER — INSULIN LISPRO 100 [IU]/ML
10 INJECTION, SOLUTION INTRAVENOUS; SUBCUTANEOUS
Qty: 45 ML | Refills: 1 | Status: SHIPPED | OUTPATIENT
Start: 2023-11-06

## 2023-11-06 NOTE — PATIENT INSTRUCTIONS
Wellness Visit for Adults   AMBULATORY CARE:   A wellness visit  is when you see your healthcare provider to get screened for health problems. Your healthcare provider will also give you advice on how to stay healthy. Write down your questions so you remember to ask them. Ask your healthcare provider how often you should have a wellness visit. What happens at a wellness visit:  Your healthcare provider will ask about your health, and your family history of health problems. This includes high blood pressure, heart disease, and cancer. He or she will ask if you have symptoms that concern you, if you smoke, and about your mood. You may also be asked about your intake of medicines, supplements, food, and alcohol. Any of the following may be done: Your weight  will be checked. Your height may also be checked so your body mass index (BMI) can be calculated. Your BMI shows if you are at a healthy weight. Your blood pressure  and heart rate will be checked. Your temperature may also be checked. Blood and urine tests  may be done. Blood tests may be done to check your cholesterol levels. Abnormal cholesterol levels increase your risk for heart disease and stroke. You may also need a blood or urine test to check for diabetes if you are at increased risk. Urine tests may be done to look for signs of an infection or kidney disease. A physical exam  includes checking your heartbeat and lungs with a stethoscope. Your healthcare provider may also check your skin to look for sun damage. Screening tests  may be recommended. A screening test is done to check for diseases that may not cause symptoms. The screening tests you may need depend on your age, gender, family history, and lifestyle habits. For example, colorectal screening may be recommended if you are 48years old or older. Screening tests you need if you are a woman:   A Pap smear  is used to screen for cervical cancer.  Pap smears are usually done every 3 to 5 years depending on your age. You may need them more often if you have had abnormal Pap smear test results in the past. Ask your healthcare provider how often you should have a Pap smear. A mammogram  is an x-ray of your breasts to screen for breast cancer. Experts recommend mammograms every 2 years starting at age 48 years. You may need a mammogram at age 52 years or younger if you have an increased risk for breast cancer. Talk to your healthcare provider about when you should start having mammograms and how often you need them. Vaccines you may need:   Get an influenza vaccine  every year. The influenza vaccine protects you from the flu. Several types of viruses cause the flu. The viruses change over time, so new vaccines are made each year. Get a tetanus-diphtheria (Td) booster vaccine  every 10 years. This vaccine protects you against tetanus and diphtheria. Tetanus is a severe infection that may cause painful muscle spasms and lockjaw. Diphtheria is a severe bacterial infection that causes a thick covering in the back of your mouth and throat. Get a human papillomavirus (HPV) vaccine  if you are female and aged 23 to 32 or male 23 to 24 and never received it. This vaccine protects you from HPV infection. HPV is the most common infection spread by sexual contact. HPV may also cause vaginal, penile, and anal cancers. Get a pneumococcal vaccine  if you are aged 72 years or older. The pneumococcal vaccine is an injection given to protect you from pneumococcal disease. Pneumococcal disease is an infection caused by pneumococcal bacteria. The infection may cause pneumonia, meningitis, or an ear infection. Get a shingles vaccine  if you are 60 or older, even if you have had shingles before. The shingles vaccine is an injection to protect you from the varicella-zoster virus. This is the same virus that causes chickenpox.  Shingles is a painful rash that develops in people who had chickenpox or have been exposed to the virus. How to eat healthy:  My Plate is a model for planning healthy meals. It shows the types and amounts of foods that should go on your plate. Fruits and vegetables make up about half of your plate, and grains and protein make up the other half. A serving of dairy is included on the side of your plate. The amount of calories and serving sizes you need depends on your age, gender, weight, and height. Examples of healthy foods are listed below:  Eat a variety of vegetables  such as dark green, red, and orange vegetables. You can also include canned vegetables low in sodium (salt) and frozen vegetables without added butter or sauces. Eat a variety of fresh fruits , canned fruit in 100% juice, frozen fruit, and dried fruit. Include whole grains. At least half of the grains you eat should be whole grains. Examples include whole-wheat bread, wheat pasta, brown rice, and whole-grain cereals such as oatmeal.    Eat a variety of protein foods such as seafood (fish and shellfish), lean meat, and poultry without skin (turkey and chicken). Examples of lean meats include pork leg, shoulder, or tenderloin, and beef round, sirloin, tenderloin, and extra lean ground beef. Other protein foods include eggs and egg substitutes, beans, peas, soy products, nuts, and seeds. Choose low-fat dairy products such as skim or 1% milk or low-fat yogurt, cheese, and cottage cheese. Limit unhealthy fats  such as butter, hard margarine, and shortening. Exercise:  Exercise at least 30 minutes per day on most days of the week. Some examples of exercise include walking, biking, dancing, and swimming. You can also fit in more physical activity by taking the stairs instead of the elevator or parking farther away from stores. Include muscle strengthening activities 2 days each week. Regular exercise provides many health benefits.  It helps you manage your weight, and decreases your risk for type 2 diabetes, heart disease, stroke, and high blood pressure. Exercise can also help improve your mood. Ask your healthcare provider about the best exercise plan for you. General health and safety guidelines:   Do not smoke. Nicotine and other chemicals in cigarettes and cigars can cause lung damage. Ask your healthcare provider for information if you currently smoke and need help to quit. E-cigarettes or smokeless tobacco still contain nicotine. Talk to your healthcare provider before you use these products. Limit alcohol. A drink of alcohol is 12 ounces of beer, 5 ounces of wine, or 1½ ounces of liquor. Lose weight, if needed. Being overweight increases your risk of certain health conditions. These include heart disease, high blood pressure, type 2 diabetes, and certain types of cancer. Protect your skin. Do not sunbathe or use tanning beds. Use sunscreen with a SPF 15 or higher. Apply sunscreen at least 15 minutes before you go outside. Reapply sunscreen every 2 hours. Wear protective clothing, hats, and sunglasses when you are outside. Drive safely. Always wear your seatbelt. Make sure everyone in your car wears a seatbelt. A seatbelt can save your life if you are in an accident. Do not use your cell phone when you are driving. This could distract you and cause an accident. Pull over if you need to make a call or send a text message. Practice safe sex. Use latex condoms if are sexually active and have more than one partner. Your healthcare provider may recommend screening tests for sexually transmitted infections (STIs). Wear helmets, lifejackets, and protective gear. Always wear a helmet when you ride a bike or motorcycle, go skiing, or play sports that could cause a head injury. Wear protective equipment when you play sports. Wear a lifejacket when you are on a boat or doing water sports.     © Copyright [facility] 2023 Information is for End User's use only and may not be sold, redistributed or otherwise used for commercial purposes. The above information is an  only. It is not intended as medical advice for individual conditions or treatments. Talk to your doctor, nurse or pharmacist before following any medical regimen to see if it is safe and effective for you. Obesity   AMBULATORY CARE:   Obesity  means your body mass index (BMI) is greater than 30. Your healthcare provider will use your age, height, and weight to measure your BMI. The risks of obesity include  many health problems, including injuries or physical disability. Diabetes (high blood sugar level)    High blood pressure or high cholesterol    Heart disease or heart failure    Stroke    Gallbladder or liver disease    Cancer of the colon, breast, prostate, liver, or kidney    Sleep apnea    Arthritis or gout    Screening  is done to check for health conditions before you have signs or symptoms. If you are 28to 79years old, your blood sugar level may be checked every 3 years for signs of prediabetes or diabetes. Your healthcare provider will check your blood pressure at each visit. High blood pressure can lead to a stroke or other problems. Your provider may check for signs of heart disease, cancer, or other health problems. Seek care immediately if:   You have a severe headache, confusion, or difficulty speaking. You have weakness on one side of your body. You have chest pain, sweating, or shortness of breath. Call your doctor if:   You have symptoms of gallbladder or liver disease, such as pain in your upper abdomen. You have knee or hip pain and discomfort while walking. You have symptoms of diabetes, such as intense hunger and thirst, and frequent urination. You have symptoms of sleep apnea, such as snoring or daytime sleepiness. You have questions or concerns about your condition or care. Treatment for obesity  focuses on helping you lose weight to improve your health.  Even a small decrease in BMI can reduce the risk for many health problems. Your healthcare provider will help you set a weight-loss goal.  Lifestyle changes  are the first step in treating obesity. These include making healthy food choices and getting regular physical activity. Your healthcare provider may suggest a weight-loss program that involves coaching, education, and therapy. Medicine  may help you lose weight when it is used with a healthy foods and physical activity. Surgery  can help you lose weight if you have obesity along with other health problems. Several types of weight-loss surgery are available. Ask your healthcare provider for more information. Tips for safe weight loss:   Set small, realistic goals. An example of a small goal is to walk for 20 minutes 5 days a week. Anther goal is to lose 5% of your body weight. Ask for support. Tell friends, family members, and coworkers about your goals. Ask someone to lose weight with you. You may also want to join a weight-loss support group. Identify foods or triggers that may cause you to overeat. Remove tempting high-calorie foods from your home and workplace. Place a bowl of fresh fruit on your kitchen counter. If stress causes you to eat, find other ways to cope with stress. A counselor or therapist may be able to help you. Track your daily calories and activity. Write down what you eat and drink. Also write down how many minutes of physical activity you do each day. Track your weekly weight. Weigh yourself in the morning, before you eat or drink anything but after you use the bathroom. Use the same scale, in the same place, and in similar clothing each time. Only weigh yourself 1 to 2 times each week, or as directed. You may become discouraged if you weigh yourself every day. Eating changes: You will need to eat 500 to 1,000 fewer calories each day than you currently eat to lose 1 to 2 pounds a week.  The following changes will help you cut calories:  Eat smaller portions. Use small plates, no larger than 9 inches in diameter. Fill your plate half full of fruits and vegetables. Measure your food using measuring cups until you know what a serving size looks like. Eat 3 meals and 1 or 2 snacks each day. Plan your meals in advance. Breana Blight and eat at home most of the time. Eat slowly. Do not skip meals. Skipping meals can lead to overeating later in the day. This can make it harder for you to lose weight. Talk with a dietitian to help you make a meal plan and schedule that is right for you. Eat fruits and vegetables at every meal.  They are low in calories and high in fiber, which makes you feel full. Do not add butter, margarine, or cream sauce to vegetables. Use herbs to season steamed vegetables. Eat less fat and fewer fried foods. Eat more baked or grilled chicken and fish. These protein sources are lower in calories and fat than red meat. Limit fast food. Dress your salads with olive oil and vinegar instead of bottled dressing. Limit the amount of sugar you eat. Do not drink sugary beverages. Limit alcohol. Activity changes:  Physical activity is good for your body in many ways. It helps you burn calories and build strong muscles. It decreases stress and depression, and improves your mood. It can also help you sleep better. Talk to your healthcare provider before you begin an exercise program.  Exercise for at least 30 minutes 5 days a week. Start slowly. Set aside time each day for physical activity that you enjoy and that is convenient for you. It is best to do both weight training and an activity that increases your heart rate, such as walking, bicycling, or swimming. Find ways to be more active. Do yard work and housecleaning. Walk up the stairs instead of using elevators. Spend your leisure time going to events that require walking, such as outdoor festivals or fairs.  This extra physical activity can help you lose weight and keep it off. Follow up with your doctor as directed: You may need to meet with a dietitian. Write down your questions so you remember to ask them during your visits. © Copyright [employer]aid Cruz 2023 Information is for End User's use only and may not be sold, redistributed or otherwise used for commercial purposes. The above information is an  only. It is not intended as medical advice for individual conditions or treatments. Talk to your doctor, nurse or pharmacist before following any medical regimen to see if it is safe and effective for you.

## 2023-11-06 NOTE — ASSESSMENT & PLAN NOTE
Lab Results   Component Value Date    HGBA1C 9.4 (H) 09/30/2023     Poorly controlled T1DM s/p recent hospital admission  Established w/endocrine - appt UTD this morning  Foot exam updated by myself today  Eye exam to be updated next week at [facility])  - will obtain report when done  Update microalbumin w/fasting labs due next month before next endocrine appt

## 2023-11-06 NOTE — PROGRESS NOTES
Prior [place] was done through [employer] for the patient's Dexcom G7  prescription. It was approved and the Pharmacy was made aware and they stated that there is no need for a prior auth for the sensors they were already processed.

## 2023-11-06 NOTE — PROGRESS NOTES
ADULT ANNUAL PHYSICAL  [facility] PRIMARY CARE [address]     NAME: Pepe Lynch  AGE: 25 y.o. SEX: male  : 1995     DATE: 2023     Assessment and Plan:     Problem List Items Addressed This Visit       DKA, type 1 ([address])       Lab Results   Component Value Date    HGBA1C 9.4 (H) 2023   Poorly controlled T1DM s/p recent hospital admission  Established w/endocrine - appt UTD this morning  Foot exam updated by myself today  Eye exam to be updated next week at [facility])  - will obtain report when done  Update microalbumin w/fasting labs due next month before next endocrine appt         Relevant Orders    TSH, 3rd generation with Free T4 reflex    HTN (hypertension)     Well controlled on lisinopril  Continue same dose as rx'd  Return in 6 months for next recheck         BMI 36.0-36.9,adult     Healthy diet and regular exercise encouraged          Other Visit Diagnoses       Annual physical exam    -  Primary    PE updated, next due in 1 year; adacel given, flu shot UTD    Encounter for immunization        Relevant Orders    TDAP VACCINE GREATER THAN OR EQUAL TO 6YO IM (Completed)    Encounter for screening for HIV        agreeable to screening    Relevant Orders    HIV 1/2 AG/AB W REFLEX [facility] and [facility] only    Need for hepatitis C screening test        agreeable to screening    Relevant Orders    Hepatitis C Ab W/Refl To HCV RNA, Qn, PCR          Immunizations and preventive care screenings were discussed with patient today. Appropriate education was printed on patient's after visit summary. Counseling:  Alcohol/drug use: discussed moderation in alcohol intake, the recommendations for healthy alcohol use, and avoidance of illicit drug use. Dental Health: discussed importance of regular tooth brushing, flossing, and dental visits. Exercise: the importance of regular exercise/physical activity was discussed.  Recommend exercise 3-5 times per week for at least 30 minutes. BMI Counseling: Body mass index is 36.39 kg/m². The BMI is above normal. Nutrition recommendations include encouraging healthy choices of fruits and vegetables, decreasing fast food intake, consuming healthier snacks, limiting drinks that contain sugar, moderation in carbohydrate intake and reducing intake of cholesterol. Exercise recommendations include exercising 3-5 times per week. No pharmacotherapy was ordered. Rationale for BMI follow-up plan is due to patient being overweight or obese. Depression Screening and Follow-up Plan: Patient was screened for depression during today's encounter. They screened negative with a PHQ-2 score of 0. Return in about 6 months (around 5/6/2024) for Next scheduled follow up. Chief Complaint:     Chief Complaint   Patient presents with    Establish Care      History of Present Illness:     Adult Annual Physical   Patient here for a comprehensive physical exam. The patient reports no problems. Diet and Physical Activity  Diet/Nutrition: well balanced diet and diabetic diet. Exercise: no formal exercise. Depression Screening  PHQ-2/9 Depression Screening    Little interest or pleasure in doing things: 0 - not at all  Feeling down, depressed, or hopeless: 0 - not at all  PHQ-2 Score: 0  PHQ-2 Interpretation: Negative depression screen       General Health  Sleep: sleeps well. Hearing: normal - bilateral.  Vision: most recent eye exam >1 year ago. Dental: no dental visits for >1 year. Review of Systems:     Review of Systems   Constitutional: Negative. HENT: Negative. Respiratory: Negative. Cardiovascular: Negative. Gastrointestinal: Negative. Genitourinary: Negative. Musculoskeletal: Negative. Neurological: Negative. Psychiatric/Behavioral:  Negative for dysphoric mood and sleep disturbance.          Past Medical History:     Past Medical History:   Diagnosis Date    Diabetes mellitus ([address] [facility])       Past Surgical History:     Past Surgical History:   Procedure Laterality Date    SINUS SURGERY        Social History:     Social History     Socioeconomic History    Marital status: Single     Spouse name: None    Number of children: None    Years of education: None    Highest education level: None   Occupational History    None   Tobacco Use    Smoking status: Never    Smokeless tobacco: Never   Vaping Use    Vaping Use: Never used   Substance and Sexual Activity    Alcohol use:  Yes     Alcohol/week: 4.0 standard drinks of alcohol     Types: 4 Cans of beer per week     Comment: Casual drinker -  Light Beer    Drug use: Never    Sexual activity: Yes     Partners: Female   Other Topics Concern    None   Social History Narrative    None     Social Determinants of Health     Financial Resource Strain: Not on file   Food Insecurity: Not on file   Transportation Needs: Not on file   Physical Activity: Not on file   Stress: Not on file   Social Connections: Not on file   Intimate Partner Violence: Not on file   Housing Stability: Not on file      Family History:     Family History   Problem Relation Age of Onset    Diabetes type II Father     Hypertension Father         Triple ByPass    Diabetes type I Maternal Grandmother         Steroid Induced Type 1    Vision loss Maternal Grandmother     Diabetes type II Paternal Grandfather     Hypertension Paternal Grandfather         Quadruple ByPass    Breast cancer Mother     Cancer Maternal Grandfather         Mouth/Throat Cancer    Cancer Paternal Grandmother         Lung Cancer      Current Medications:     Current Outpatient Medications   Medication Sig Dispense Refill    acetone, urine, test (Ketostix) strip Use 1 strip if needed for high blood sugar 25 each 0    Continuous Blood Gluc  (Dexcom G7 ) CORI Use 1 each in the morning 1 each 0    Continuous Blood Gluc Sensor (Dexcom G7 Sensor) Use 1 Device every 10 days 10 each 1    Glucagon 1 MG/0.2ML SOAJ Inject 1 mg under the skin if needed (hypoglycemic) 0.2 mL 1    insulin degludec Jennifer China FlexTouch) 100 units/mL injection pen Inject 60 Units under the skin daily Can titrate upto 75u daily, increase 2u every 3 days that fasting BG >150 15 mL 1    insulin lispro (HumaLOG KwikPen) 100 units/mL injection pen Inject 10 Units under the skin 3 (three) times a day with meals 45 mL 1    lisinopril (ZESTRIL) 10 mg tablet Take 10 mg by mouth daily       No current facility-administered medications for this visit. Allergies:     No Known Allergies   Physical Exam:     /80   Pulse 88   Temp 98.8 °F (37.1 °C)   Ht 6' 2" (1.88 m)   Wt 129 kg (283 lb 6.4 oz)   BMI 36.39 kg/m²       Physical Exam  Vitals reviewed. Constitutional:       General: He is not in acute distress. Appearance: Normal appearance. HENT:      Head: Normocephalic. Right Ear: Tympanic membrane normal.      Left Ear: Tympanic membrane normal.      Nose: Nose normal.      Mouth/Throat:      Mouth: Mucous membranes are moist.      Pharynx: Oropharynx is clear. Eyes:      General: No scleral icterus. Cardiovascular:      Rate and Rhythm: Normal rate and regular rhythm. Pulses: no weak pulses          Dorsalis pedis pulses are 1+ on the right side and 1+ on the left side. Posterior tibial pulses are 1+ on the right side and 1+ on the left side. Heart sounds: No murmur heard. Pulmonary:      Effort: Pulmonary effort is normal. No respiratory distress. Breath sounds: Normal breath sounds. Musculoskeletal:         General: Normal range of motion. Cervical back: Normal range of motion. Right lower leg: No edema. Left lower leg: No edema. Feet:      Right foot:      Skin integrity: No ulcer, skin breakdown, erythema, warmth, callus or dry skin. Left foot:      Skin integrity: No ulcer, skin breakdown, erythema, warmth, callus or dry skin.    Lymphadenopathy:      Cervical: No cervical adenopathy. Skin:     General: Skin is warm and dry. Neurological:      General: No focal deficit present. Mental Status: He is alert and oriented to person, place, and time. Cranial Nerves: No cranial nerve deficit. Motor: No weakness. Gait: Gait normal.   Psychiatric:         Mood and Affect: Mood normal.         Behavior: Behavior normal.         Thought Content: Thought content normal.         Judgment: Judgment normal.          Patient's shoes and socks removed. Right Foot/Ankle   Right Foot Inspection  Skin Exam: skin normal and skin intact. No dry skin, no warmth, no callus, no erythema, no maceration, no abnormal color, no pre-ulcer, no ulcer and no callus. Toe Exam: ROM and strength within normal limits. Sensory   Vibration: intact  Proprioception: intact  Monofilament testing: intact    Vascular  The right DP pulse is 1+. The right PT pulse is 1+. Right Toe  - Comprehensive Exam  Ecchymosis: none  Arch: normal  Hammertoes: absent  Claw Toes: absent  Swelling: none   Tenderness: none       Left Foot/Ankle  Left Foot Inspection  Skin Exam: skin normal and skin intact. No dry skin, no warmth, no erythema, no maceration, normal color, no pre-ulcer, no ulcer and no callus. Toe Exam: ROM and strength within normal limits. Sensory   Vibration: intact  Proprioception: intact  Monofilament testing: intact    Vascular  The left DP pulse is 1+. The left PT pulse is 1+. Left Toe  - Comprehensive Exam  Ecchymosis: none  Arch: normal  Hammertoes: absent  Claw toes: absent  Swelling: none   Tenderness: none       Assign Risk Category  No deformity present  No loss of protective sensation  No weak pulses  Risk: 0      JORJE Sousa   [facility] PRIMARY CARE [address]       BMI Counseling: Body mass index is 36.39 kg/m².  The BMI is above normal. Nutrition recommendations include 3-5 servings of fruits/vegetables daily, reducing fast food intake, consuming healthier snacks, decreasing soda and/or juice intake, moderation in carbohydrate intake, and reducing intake of cholesterol. Exercise recommendations include exercising 3-5 times per week and joining a gym.

## 2023-11-06 NOTE — PROGRESS NOTES
New Patient Consult Note      Chief Complaint   Patient presents with    Diabetes Type 1      Referring Provider  Dalton Sarmiento Md  [address] [facility],   [facility]     History of Present Illness:   Enid Pressley is a 25 y.o. male with a history of type 1 diabetes since age 14, Without any complications  with other PMHx of hypertension and class 2 obesity who presents today for diabetes management. Previously managed by endocrine at [facility] 4 years ago but d/t insurance issues couldn't follow up further. Reports was on OTC insulin from [facility] [place] d/t insurance issues and had recent episode of DKA 09/29/23 d/t viral illness who presents today for establish care. Patient was first diagnosed with T1DM- age 14, based on routine blood work  Control since diagnosis: no previous Hx, recent A1C 9.4%  Medications tried thus far: MDI, was on medtronic pump in past, d/c d/t sweating when working in [employer]  Current regime:- levemir 35u BID, humalog 1:10 CR and 1:15 for goal 120  BG control:- Pepe Lynch   Device used Dexcom G7    Indication   Type 1 Diabetes    More than 72 hours of data was reviewed. Report to be scanned to chart. Date Range: Oct 14- Oct 27 2023    Analysis of data:   Average Glucose: 206  Time in Target Range: 10%  Time Above Range: 34%/53%  Time Below Range: 3%    Interpretation of data: BG edgard throughout the day without any PPH, does have low BG mid afternoon- does not each lunch    Hypoglycemic episodes: see above  Hyperglycemia symptoms: no polyuria or polydipsia","no chest pain, dyspnea or TIAs","no numbness, tingling or pain in extremities"  Diet: eats bk and dinner.  But doesn't each lunch  Activity: limited, works a desk job    HOSPITALIZATIONS:   Hospitalizations or ED visit for DKA: 09/29/23  Hospitalizations/ED visits since the last office visit: none  Hospitalizations or ED visit for hypoglycemia: none  Hospitalizations/ED visits since the last office visit: None  Date of last hospitalization/ED visit: 04/90/00    COMPLICATIONS OF DIABETES:   Eye disease in the past: None, hasn't has exam in 2 years  Nephropathy: denies, no labs    Peripheral neuropathy:  None  History of foot sores/infection: None  Gastroparesis: None  Hypoglycemia awareness: Yes  Hypoglycemic seizure: No  Fatty Liver disease: Unknown  Cardiovascular disease: No  Cerebrovascular disease: No  Peripheral Vascular: No  Hx of hypertension: Yes  Last Lipid:- None  Last urine microalbumin: none  Last hbA1C: 9.4%    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, occasional alcohol use, works as a dispEQO  Family HX:- mostly T2DM    Patient Active Problem List   Diagnosis    DKA, type 1 ([address])    HTN (hypertension)    Tachycardia    BMI 36.0-36.9,adult      Past Medical History:   Diagnosis Date    Diabetes mellitus ([address])       Past Surgical History:   Procedure Laterality Date    SINUS SURGERY        Family History   Problem Relation Age of Onset    Diabetes type II Father     Hypertension Father         Triple ByPass    Diabetes type I Maternal Grandmother         Steroid Induced Type 1    Vision loss Maternal Grandmother     Diabetes type II Paternal Grandfather     Hypertension Paternal Grandfather         Quadruple ByPass    Breast cancer Mother     Cancer Maternal Grandfather         Mouth/Throat Cancer    Cancer Paternal Grandmother         Lung Cancer     Social History     Tobacco Use    Smoking status: Never    Smokeless tobacco: Never   Substance Use Topics    Alcohol use:  Yes     Alcohol/week: 4.0 standard drinks of alcohol     Types: 4 Cans of beer per week     Comment: Casual drinker -  Light Beer     No Known Allergies      Current Outpatient Medications:     acetone, urine, test (Ketostix) strip, Use 1 strip if needed for high blood sugar, Disp: 25 each, Rfl: 0    Continuous Blood Gluc  (Dexcom G7 ) CORI, Use 1 each in the morning, Disp: 1 each, Rfl: 0    Continuous Blood Gluc Sensor (Dexcom G7 Sensor), Use 1 Device every 10 days, Disp: 10 each, Rfl: 1    Glucagon 1 MG/0.2ML SOAJ, Inject 1 mg under the skin if needed (hypoglycemic), Disp: 0.2 mL, Rfl: 1    insulin degludec Freedom Elvie FlexTouch) 100 units/mL injection pen, Inject 60 Units under the skin daily Can titrate upto 75u daily, increase 2u every 3 days that fasting BG >150, Disp: 15 mL, Rfl: 1    insulin lispro (HumaLOG KwikPen) 100 units/mL injection pen, Inject 10 Units under the skin 3 (three) times a day with meals, Disp: 45 mL, Rfl: 1    lisinopril (ZESTRIL) 10 mg tablet, Take 10 mg by mouth daily, Disp: , Rfl:   Review of Systems   Constitutional:  Negative for diaphoresis, fatigue and unexpected weight change. Respiratory:  Negative for shortness of breath. Cardiovascular:  Negative for chest pain and palpitations. Gastrointestinal:  Negative for constipation and diarrhea. Endocrine: Negative for polydipsia and polyuria. Physical Exam:  Body mass index is 36.3 kg/m². /80   Pulse 101   Ht 6' 2" (1.88 m)   Wt 128 kg (282 lb 11.2 oz)   SpO2 98%   BMI 36.30 kg/m²    Wt Readings from Last 3 Encounters:   11/06/23 129 kg (283 lb 6.4 oz)   11/06/23 128 kg (282 lb 11.2 oz)   10/09/23 126 kg (276 lb 12.8 oz)       Physical Exam  Constitutional:       Appearance: Normal appearance. He is obese. Cardiovascular:      Rate and Rhythm: Normal rate and regular rhythm. Pulses: Normal pulses. Pulmonary:      Effort: Pulmonary effort is normal.   Abdominal:      General: Abdomen is flat. Palpations: Abdomen is soft. Skin:     General: Skin is warm. Capillary Refill: Capillary refill takes less than 2 seconds. Neurological:      General: No focal deficit present. Mental Status: He is alert. Diabetic Foot Exam    Labs:    Latest Reference Range & Units Most Recent   Hemoglobin A1C Normal 4.0-5.6%; PreDiabetic 5.7-6.4%;  Diabetic >=6.5%; Glycemic control for adults with diabetes <7.0% % 9.4 (H)  9/30/23 04:51   eAG, EST AVG Glucose mg/dl 223  9/30/23 04:51   POC Glucose 65 - 140 mg/dl 351 (H)  10/9/23 22:56   (H): Data is abnormally high    Impression:  1. Primary hypertension    2. DKA, type 1 ([address] [facility])    3. BMI 36.0-36.9,adult         Plan:    Ana Rodriguez was seen today for diabetes type 1. Diagnoses and all orders for this visit:    Primary hypertension    DKA, type 1 ([address] [facility])  -     Ambulatory Referral to Endocrinology  -     Hemoglobin A1C; Future  -     Comprehensive metabolic panel; Future  -     Albumin / creatinine urine ratio; Future  -     Lipid panel; Future  -     Cancel: Ambulatory referral to Diabetic Education; Future  -     Ambulatory referral to Diabetic Education; Future  -     Continuous Blood Gluc  (Dexcom G7 ) CORI; Use 1 each in the morning  -     Continuous Blood Gluc Sensor (Dexcom G7 Sensor); Use 1 Device every 10 days  -     insulin degludec Efren Cookey FlexTouch) 100 units/mL injection pen; Inject 60 Units under the skin daily Can titrate upto 75u daily, increase 2u every 3 days that fasting BG >150  -     insulin lispro (HumaLOG KwikPen) 100 units/mL injection pen; Inject 10 Units under the skin 3 (three) times a day with meals  -     acetone, urine, test (Ketostix) strip; Use 1 strip if needed for high blood sugar  -     Glucagon 1 MG/0.2ML SOAJ; Inject 1 mg under the skin if needed (hypoglycemic)    BMI 36.0-36.9,adult      1. DKA, type 1 ([address] [facility])    - Ambulatory Referral to Endocrinology  - Hemoglobin A1C; Future  - Comprehensive metabolic panel; Future  - Albumin / creatinine urine ratio; Future  - Lipid panel; Future  - Ambulatory referral to Diabetic Education; Future  - Continuous Blood Gluc  (Dexcom G7 ) CORI; Use 1 each in the morning  Dispense: 1 each; Refill: 0  - Continuous Blood Gluc Sensor (Dexcom G7 Sensor); Use 1 Device every 10 days  Dispense: 10 each; Refill: 1  - insulin degludec Efren Cookey FlexTouch) 100 units/mL injection pen;  Inject 60 Units under the skin daily Can titrate upto 75u daily, increase 2u every 3 days that fasting BG >150  Dispense: 15 mL; Refill: 1  - insulin lispro (HumaLOG KwikPen) 100 units/mL injection pen; Inject 10 Units under the skin 3 (three) times a day with meals  Dispense: 45 mL; Refill: 1  - acetone, urine, test (Ketostix) strip; Use 1 strip if needed for high blood sugar  Dispense: 25 each; Refill: 0  - Glucagon 1 MG/0.2ML SOAJ; Inject 1 mg under the skin if needed (hypoglycemic)  Dispense: 0.2 mL; Refill: 1    2. Primary hypertension      3. BMI 36.0-36.9,adult        Patient is a 29y With PMHx of T1DM since 14 without any complications with Hx of DKA with unclear etiology, with Other PMHx of hypertension and class 2 obesity Who presents today for diabetic care. 1) T1DM:- Patients hbA1C is elevated at 9.4% with CGM data showing TIR only 10%, does have diffuse hyperglycemia otherwise. Having low BG consistency with lunch time since not eating lunch. Recommend increasing basal insulin dose but switching to tresiba daily rather than levemir BID. Start on tresiba 60u daily and dose titrate up by 2u every 3 days that fasting BG >150, total daily basal until 75u. C/w humalog with meals 1:10 but will reduce ISF to 1:20 for goal 120 given hypoglycemia mid day possible d/t excess correction during day for hyperglycemia. Will refer to CDE to talk about insulin pump   Refills for dexcom, humalog, ketostix, Glucagon send  Labs in 6 weeks     Plan   - Tresiba 60u   - Humalog 1:10 CR and 1:20 ISF goal 120    Screening:-   Retinopathy- not UTD will perform before next visit  Nephropathy= not UTD  Lipide levels- Not UTD    2) Hypertension:- BP in clinic 128/80, cw current regime    3) Hyperlipidemia:- will check for next visit     RTC in 6 weeks     Discussed with the patient and all questioned fully answered. He will call me if any problems arise.     Counseled patient on diagnostic results, prognosis, risk and benefit of treatment options, instruction for management, importance of treatment compliance, Risk  factor reduction and impressions      Gabbi Stoner MD

## 2023-11-21 ENCOUNTER — OFFICE VISIT (OUTPATIENT)
Dept: ENDOCRINOLOGY | Facility: HOSPITAL | Age: 28
End: 2023-11-21
Payer: COMMERCIAL

## 2023-11-21 VITALS
DIASTOLIC BLOOD PRESSURE: 82 MMHG | SYSTOLIC BLOOD PRESSURE: 122 MMHG | BODY MASS INDEX: 36.32 KG/M2 | OXYGEN SATURATION: 98 % | HEART RATE: 108 BPM | HEIGHT: 74 IN | WEIGHT: 283 LBS

## 2023-11-21 DIAGNOSIS — E10.10 DKA, TYPE 1 (HCC): ICD-10-CM

## 2023-11-21 DIAGNOSIS — E10.10 TYPE 1 DIABETES MELLITUS WITH KETOACIDOSIS WITHOUT COMA (HCC): Primary | ICD-10-CM

## 2023-11-21 PROCEDURE — 99215 OFFICE O/P EST HI 40 MIN: CPT | Performed by: STUDENT IN AN ORGANIZED HEALTH CARE EDUCATION/TRAINING PROGRAM

## 2023-11-21 RX ORDER — INSULIN LISPRO 100 [IU]/ML
10-30 INJECTION, SOLUTION INTRAVENOUS; SUBCUTANEOUS
Qty: 45 ML | Refills: 1 | Status: SHIPPED | OUTPATIENT
Start: 2023-11-21

## 2023-11-21 NOTE — PROGRESS NOTES
Follow up Progress note    Chief Complaint   Patient presents with    Diabetes Type 1     History of Present Illness:   Nancy Lindsey is a 25 y.o. male with a history of type 1 diabetes since age 14, Without any complications  with other PMHx of hypertension and class 2 obesity who presents today for diabetes management. Previously managed by endocrine at [facility] 4 years ago but d/t insurance issues couldn't follow up further. Reports was on OTC insulin from [facility] d/t insurance issues and had recent episode of DKA 09/29/23 d/t viral illness who presents today for establish care. Patient was first diagnosed with T1DM- age 14, based on routine blood work  Control since diagnosis: no previous Hx, recent A1C 9.4%  Medications tried thus far: MDI, was on medtronic pump in past, d/c d/t sweating when working in [place]  Current regime:- Tresiba 66u in AM, humalog 1:10 for bk and lunch and 1:8 for dinner and ISF 1:15 for goal 120  BG control:- Pepe Lynch   Device used Dexcom G7    Indication   Type 1 Diabetes    More than 72 hours of data was reviewed. Report to be scanned to chart. Date Range:   Analysis of data: Nov 8- Nov 21 2023  Average Glucose: 206  Time in Target Range:38%  (10%)  Time Above Range: 35%/26% (34%/53%)  Time Below Range: <1% (3%)    Interpretation of data: high BG variability specially with meals. Hypoglycemic episodes: see above  Hyperglycemia symptoms: no polyuria or polydipsia","no chest pain, dyspnea or TIAs","no numbness, tingling or pain in extremities"  Diet: eats bk and dinner reports eats consistent meals for the most part and feels his carb counting is ok but doesn't keep a long. Also eats lunch now and also 2 snacks per day. Does report giving himself insulin for snack as well.  Not all data mentioned in dexcom and thus hard to interprete  Activity: limited, works a desk job    HOSPITALIZATIONS:   Hospitalizations or ED visit for DKA: 09/29/23  Hospitalizations/ED visits since the last office visit: none  Hospitalizations or ED visit for hypoglycemia: none  Hospitalizations/ED visits since the last office visit: None  Date of last hospitalization/ED visit: 45/15/92    COMPLICATIONS OF DIABETES:   Eye disease in the past: None, hasn't has exam in 2 years  Nephropathy: denies, no labs    Peripheral neuropathy:  None  History of foot sores/infection: None  Gastroparesis: None  Hypoglycemia awareness: Yes  Hypoglycemic seizure: No  Fatty Liver disease: Unknown  Cardiovascular disease: No  Cerebrovascular disease: No  Peripheral Vascular: No  Hx of hypertension: Yes  Last Lipid:- None  Last urine microalbumin: none  Last hbA1C: 9.4% 09/23    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, occasional alcohol use, works as a [employer]  Family HX:- mostly T2DM    Patient Active Problem List   Diagnosis    DKA, type 1 ([address])    HTN (hypertension)    Tachycardia    BMI 36.0-36.9,adult      Past Medical History:   Diagnosis Date    Diabetes mellitus ([address])       Past Surgical History:   Procedure Laterality Date    SINUS SURGERY        Family History   Problem Relation Age of Onset    Diabetes type II Father     Hypertension Father         Triple ByPass    Diabetes type I Maternal Grandmother         Steroid Induced Type 1    Vision loss Maternal Grandmother     Diabetes type II Paternal Grandfather     Hypertension Paternal Grandfather         Quadruple ByPass    Breast cancer Mother     Cancer Maternal Grandfather         Mouth/Throat Cancer    Cancer Paternal Grandmother         Lung Cancer     Social History     Tobacco Use    Smoking status: Never    Smokeless tobacco: Never   Substance Use Topics    Alcohol use:  Yes     Alcohol/week: 4.0 standard drinks of alcohol     Types: 4 Cans of beer per week     Comment: Casual drinker -  Light Beer     No Known Allergies      Current Outpatient Medications:     acetone, urine, test (Ketostix) strip, Use 1 strip if needed for high blood sugar, Disp: 25 each, Rfl: 0    Continuous Blood Gluc  (Dexcom G7 ) CORI, Use 1 each in the morning, Disp: 1 each, Rfl: 0    Continuous Blood Gluc Sensor (Dexcom G7 Sensor), Use 1 Device every 10 days, Disp: 10 each, Rfl: 1    Glucagon 1 MG/0.2ML SOAJ, Inject 1 mg under the skin if needed (hypoglycemic), Disp: 0.2 mL, Rfl: 1    insulin degludec [place] FlexTouch) 100 units/mL injection pen, Inject 60 Units under the skin daily Can titrate upto 75u daily, increase 2u every 3 days that fasting BG >150, Disp: 15 mL, Rfl: 1    insulin lispro (HumaLOG KwikPen) 100 units/mL injection pen, Inject 10 Units under the skin 3 (three) times a day with meals, Disp: 45 mL, Rfl: 1    lisinopril (ZESTRIL) 10 mg tablet, Take 10 mg by mouth daily, Disp: , Rfl:   Review of Systems   Constitutional:  Negative for diaphoresis, fatigue and unexpected weight change. Respiratory:  Negative for shortness of breath. Cardiovascular:  Negative for chest pain and palpitations. Gastrointestinal:  Negative for constipation and diarrhea. Endocrine: Negative for polydipsia and polyuria. Physical Exam:  Body mass index is 36.34 kg/m². /82   Pulse (!) 108   Ht 6' 2" (1.88 m)   Wt 128 kg (283 lb)   SpO2 98%   BMI 36.34 kg/m²    Wt Readings from Last 3 Encounters:   11/21/23 128 kg (283 lb)   11/06/23 129 kg (283 lb 6.4 oz)   11/06/23 128 kg (282 lb 11.2 oz)       Physical Exam  Constitutional:       Appearance: Normal appearance. He is obese. Cardiovascular:      Rate and Rhythm: Normal rate and regular rhythm. Pulses: Normal pulses. Pulmonary:      Effort: Pulmonary effort is normal.   Abdominal:      General: Abdomen is flat. Palpations: Abdomen is soft. Skin:     General: Skin is warm. Capillary Refill: Capillary refill takes less than 2 seconds. Neurological:      General: No focal deficit present. Mental Status: He is alert.          Labs:    Latest Reference Range & Units Most Recent   Hemoglobin A1C Normal 4.0-5.6%; PreDiabetic 5.7-6.4%; Diabetic >=6.5%; Glycemic control for adults with diabetes <7.0% % 9.4 (H)  9/30/23 04:51   eAG, EST AVG Glucose mg/dl 223  9/30/23 04:51   POC Glucose 65 - 140 mg/dl 351 (H)  10/9/23 22:56   (H): Data is abnormally high    Impression:  No diagnosis found. Plan:    There are no diagnoses linked to this encounter. 1. DKA, type 1 ([address] [facility])    - Ambulatory Referral to Endocrinology  - Hemoglobin A1C; Future  - Comprehensive metabolic panel; Future  - Albumin / creatinine urine ratio; Future  - Lipid panel; Future  - Ambulatory referral to Diabetic Education; Future  - Continuous Blood Gluc  (Dexcom G7 ) CORI; Use 1 each in the morning  Dispense: 1 each; Refill: 0  - Continuous Blood Gluc Sensor (Dexcom G7 Sensor); Use 1 Device every 10 days  Dispense: 10 each; Refill: 1  - insulin degludec [place] FlexTouch) 100 units/mL injection pen; Inject 60 Units under the skin daily Can titrate upto 75u daily, increase 2u every 3 days that fasting BG >150  Dispense: 15 mL; Refill: 1  - insulin lispro (HumaLOG KwikPen) 100 units/mL injection pen; Inject 10 Units under the skin 3 (three) times a day with meals  Dispense: 45 mL; Refill: 1  - acetone, urine, test (Ketostix) strip; Use 1 strip if needed for high blood sugar  Dispense: 25 each; Refill: 0  - Glucagon 1 MG/0.2ML SOAJ; Inject 1 mg under the skin if needed (hypoglycemic)  Dispense: 0.2 mL; Refill: 1    2. Primary hypertension      3. BMI 36.0-36.9,adult        Patient is a 29y With PMHx of T1DM since 14 without any complications with Hx of DKA with unclear etiology, with Other PMHx of hypertension and class 2 obesity Who presents today for diabetic care. Seen today for urgent BG review d/t labile BG control. 1) T1DM:- Patient currently on tresiba 66u daily and novolog CR 1:10 for bk/ln and 1:8 for dinner and ISF 1:15 for goal 120.  BG log review shows TIR has improved to 38% now from 10% but still having hyperglycemia with high BG variability. Unsure if this is d/t issues with carb counting. Does report changing insulin sites and also counting carbs and feeling he is doing ok. Does have several periods of hypoglycemia after hyperglycemia- possible d/t ISF. Therefore will change ISF from 1:15 to 1:20. Discussed primarily having high BG in morning and therefore would recommend changing CR from 1:10 to 1:8 for bk, c/w 1:10 lunch and 1:8 for dinner. Discussed current diet is high in carbs and therefore should restrict his carb intake to <45g per meal and 15g per snack. Also not to eat <2hrs, to avoid insulin stacking. Labs in 4 weeks . Please follow up with CDE and keep food diary to make sure is carb counting correctly. Also discuss insulin pumps for future to have better BG control     Plan   - Vicky Ricks 66u   - Humalog 1:8u bk and dinner and 1:10 for lunch, and 1:20 ISF goal 120    Screening:-   Retinopathy- not UTD will perform before next visit  Nephropathy= not UTD  Lipide levels- Not UTD    2) Hypertension:- BP in clinic 122/82, cw current regime    3) Hyperlipidemia:- will check for next visit     RTC in 4 weeks     Discussed with the patient and all questioned fully answered. He will call me if any problems arise. Counseled patient on diagnostic results, prognosis, risk and benefit of treatment options, instruction for management, importance of treatment compliance, Risk  factor reduction and impressions    I have spent a total time of 40 minutes on 11/21/23 in caring for this patient including Diagnostic results, Prognosis, Risks and benefits of tx options, Instructions for management, and Patient and family education.      Nery Fox MD

## 2023-11-26 ENCOUNTER — HOSPITAL ENCOUNTER (EMERGENCY)
Facility: HOSPITAL | Age: 28
Discharge: HOME | End: 2023-11-26
Attending: EMERGENCY MEDICINE
Payer: COMMERCIAL

## 2023-11-26 ENCOUNTER — APPOINTMENT (EMERGENCY)
Dept: RADIOLOGY | Facility: HOSPITAL | Age: 28
End: 2023-11-26
Payer: COMMERCIAL

## 2023-11-26 VITALS
SYSTOLIC BLOOD PRESSURE: 145 MMHG | DIASTOLIC BLOOD PRESSURE: 88 MMHG | TEMPERATURE: 97 F | RESPIRATION RATE: 22 BRPM | OXYGEN SATURATION: 99 % | HEART RATE: 104 BPM

## 2023-11-26 DIAGNOSIS — E10.9 TYPE 1 DIABETES (HCC): ICD-10-CM

## 2023-11-26 DIAGNOSIS — E10.65 POORLY CONTROLLED TYPE 1 DIABETES MELLITUS (HCC): ICD-10-CM

## 2023-11-26 DIAGNOSIS — R73.9 HYPERGLYCEMIA: Primary | ICD-10-CM

## 2023-11-26 DIAGNOSIS — R11.2 NAUSEA AND VOMITING: ICD-10-CM

## 2023-11-26 LAB
ALBUMIN SERPL BCP-MCNC: 4.8 G/DL (ref 3.5–5)
ALP SERPL-CCNC: 67 U/L (ref 34–104)
ALT SERPL W P-5'-P-CCNC: 11 U/L (ref 7–52)
ANION GAP SERPL CALCULATED.3IONS-SCNC: 7 MMOL/L
ANION GAP SERPL CALCULATED.3IONS-SCNC: 7 MMOL/L
AST SERPL W P-5'-P-CCNC: 10 U/L (ref 13–39)
ATRIAL RATE: 103 BPM
BASE EXCESS BLDA CALC-SCNC: 4 MMOL/L (ref -2–3)
BETA-HYDROXYBUTYRATE: 0.5 MMOL/L
BILIRUB SERPL-MCNC: 0.66 MG/DL (ref 0.2–1)
BILIRUB UR QL STRIP: NEGATIVE
BUN SERPL-MCNC: 24 MG/DL (ref 5–25)
BUN SERPL-MCNC: 24 MG/DL (ref 5–25)
CA-I BLD-SCNC: 1.27 MMOL/L (ref 1.12–1.32)
CALCIUM SERPL-MCNC: 10 MG/DL (ref 8.4–10.2)
CALCIUM SERPL-MCNC: 10 MG/DL (ref 8.4–10.2)
CHLORIDE SERPL-SCNC: 94 MMOL/L (ref 96–108)
CHLORIDE SERPL-SCNC: 95 MMOL/L (ref 96–108)
CLARITY UR: CLEAR
CO2 SERPL-SCNC: 28 MMOL/L (ref 21–32)
CO2 SERPL-SCNC: 28 MMOL/L (ref 21–32)
COLOR UR: ABNORMAL
CREAT SERPL-MCNC: 0.83 MG/DL (ref 0.6–1.3)
CREAT SERPL-MCNC: 0.83 MG/DL (ref 0.6–1.3)
ERYTHROCYTE [DISTWIDTH] IN BLOOD BY AUTOMATED COUNT: 12.1 % (ref 11.6–15.1)
GFR SERPL CREATININE-BSD FRML MDRD: 118 ML/MIN/1.73SQ M
GFR SERPL CREATININE-BSD FRML MDRD: 118 ML/MIN/1.73SQ M
GLUCOSE SERPL-MCNC: 300 MG/DL (ref 65–140)
GLUCOSE SERPL-MCNC: 398 MG/DL (ref 65–140)
GLUCOSE SERPL-MCNC: 447 MG/DL (ref 65–140)
GLUCOSE SERPL-MCNC: 451 MG/DL (ref 65–140)
GLUCOSE SERPL-MCNC: 454 MG/DL (ref 65–140)
GLUCOSE UR STRIP-MCNC: ABNORMAL MG/DL
HCO3 BLDA-SCNC: 30.6 MMOL/L (ref 24–30)
HCT VFR BLD AUTO: 46.7 % (ref 36.5–49.3)
HCT VFR BLD CALC: 46 % (ref 36.5–49.3)
HGB BLD-MCNC: 15.7 G/DL (ref 12–17)
HGB BLDA-MCNC: 15.6 G/DL (ref 12–17)
HGB UR QL STRIP.AUTO: NEGATIVE
KETONES UR STRIP-MCNC: ABNORMAL MG/DL
LACTATE SERPL-SCNC: 1 MMOL/L (ref 0.5–2)
LEUKOCYTE ESTERASE UR QL STRIP: NEGATIVE
LIPASE SERPL-CCNC: 20 U/L (ref 11–82)
MAGNESIUM SERPL-MCNC: 2.2 MG/DL (ref 1.9–2.7)
MAGNESIUM SERPL-MCNC: 2.2 MG/DL (ref 1.9–2.7)
MCH RBC QN AUTO: 27.8 PG (ref 26.8–34.3)
MCHC RBC AUTO-ENTMCNC: 33.6 G/DL (ref 31.4–37.4)
MCV RBC AUTO: 83 FL (ref 82–98)
NITRITE UR QL STRIP: NEGATIVE
P AXIS: 44 DEGREES
PCO2 BLD: 32 MMOL/L (ref 21–32)
PCO2 BLD: 53.3 MM HG (ref 42–50)
PH BLD: 7.37 [PH] (ref 7.3–7.4)
PH UR STRIP.AUTO: 6.5 [PH]
PHOSPHATE SERPL-MCNC: 3.5 MG/DL (ref 2.7–4.5)
PHOSPHATE SERPL-MCNC: 3.5 MG/DL (ref 2.7–4.5)
PLATELET # BLD AUTO: 249 THOUSANDS/UL (ref 149–390)
PMV BLD AUTO: 10.7 FL (ref 8.9–12.7)
PO2 BLD: 29 MM HG (ref 35–45)
POTASSIUM BLD-SCNC: 5 MMOL/L (ref 3.5–5.3)
POTASSIUM SERPL-SCNC: 4.7 MMOL/L (ref 3.5–5.3)
POTASSIUM SERPL-SCNC: 4.8 MMOL/L (ref 3.5–5.3)
PR INTERVAL: 162 MS
PROT SERPL-MCNC: 8.1 G/DL (ref 6.4–8.4)
PROT UR STRIP-MCNC: NEGATIVE MG/DL
QRS AXIS: 47 DEGREES
QRSD INTERVAL: 94 MS
QT INTERVAL: 340 MS
QTC INTERVAL: 445 MS
RBC # BLD AUTO: 5.64 MILLION/UL (ref 3.88–5.62)
SAO2 % BLD FROM PO2: 52 % (ref 60–85)
SODIUM BLD-SCNC: 133 MMOL/L (ref 136–145)
SODIUM SERPL-SCNC: 129 MMOL/L (ref 135–147)
SODIUM SERPL-SCNC: 130 MMOL/L (ref 135–147)
SP GR UR STRIP.AUTO: <1.005 (ref 1–1.03)
SPECIMEN SOURCE: ABNORMAL
T WAVE AXIS: 38 DEGREES
UROBILINOGEN UR STRIP-ACNC: <2 MG/DL
VENTRICULAR RATE: 103 BPM
WBC # BLD AUTO: 9.17 THOUSAND/UL (ref 4.31–10.16)

## 2023-11-26 PROCEDURE — 80053 COMPREHEN METABOLIC PANEL: CPT | Performed by: PHYSICIAN ASSISTANT

## 2023-11-26 PROCEDURE — 84100 ASSAY OF PHOSPHORUS: CPT | Performed by: PHYSICIAN ASSISTANT

## 2023-11-26 PROCEDURE — 93005 ELECTROCARDIOGRAM TRACING: CPT

## 2023-11-26 PROCEDURE — 99285 EMERGENCY DEPT VISIT HI MDM: CPT | Performed by: PHYSICIAN ASSISTANT

## 2023-11-26 PROCEDURE — 96365 THER/PROPH/DIAG IV INF INIT: CPT

## 2023-11-26 PROCEDURE — 82948 REAGENT STRIP/BLOOD GLUCOSE: CPT

## 2023-11-26 PROCEDURE — 96375 TX/PRO/DX INJ NEW DRUG ADDON: CPT

## 2023-11-26 PROCEDURE — 36415 COLL VENOUS BLD VENIPUNCTURE: CPT | Performed by: PHYSICIAN ASSISTANT

## 2023-11-26 PROCEDURE — 93010 ELECTROCARDIOGRAM REPORT: CPT | Performed by: INTERNAL MEDICINE

## 2023-11-26 PROCEDURE — 99284 EMERGENCY DEPT VISIT MOD MDM: CPT

## 2023-11-26 PROCEDURE — 82803 BLOOD GASES ANY COMBINATION: CPT

## 2023-11-26 PROCEDURE — 96366 THER/PROPH/DIAG IV INF ADDON: CPT

## 2023-11-26 PROCEDURE — 82330 ASSAY OF CALCIUM: CPT

## 2023-11-26 PROCEDURE — 71045 X-RAY EXAM CHEST 1 VIEW: CPT

## 2023-11-26 PROCEDURE — 85014 HEMATOCRIT: CPT

## 2023-11-26 PROCEDURE — 83690 ASSAY OF LIPASE: CPT | Performed by: PHYSICIAN ASSISTANT

## 2023-11-26 PROCEDURE — 83735 ASSAY OF MAGNESIUM: CPT | Performed by: PHYSICIAN ASSISTANT

## 2023-11-26 PROCEDURE — 82947 ASSAY GLUCOSE BLOOD QUANT: CPT

## 2023-11-26 PROCEDURE — 85027 COMPLETE CBC AUTOMATED: CPT | Performed by: PHYSICIAN ASSISTANT

## 2023-11-26 PROCEDURE — 83605 ASSAY OF LACTIC ACID: CPT | Performed by: PHYSICIAN ASSISTANT

## 2023-11-26 PROCEDURE — 82010 KETONE BODYS QUAN: CPT | Performed by: PHYSICIAN ASSISTANT

## 2023-11-26 PROCEDURE — 80048 BASIC METABOLIC PNL TOTAL CA: CPT | Performed by: PHYSICIAN ASSISTANT

## 2023-11-26 PROCEDURE — 84295 ASSAY OF SERUM SODIUM: CPT

## 2023-11-26 PROCEDURE — 84132 ASSAY OF SERUM POTASSIUM: CPT

## 2023-11-26 RX ORDER — SODIUM CHLORIDE, SODIUM GLUCONATE, SODIUM ACETATE, POTASSIUM CHLORIDE, MAGNESIUM CHLORIDE, SODIUM PHOSPHATE, DIBASIC, AND POTASSIUM PHOSPHATE .53; .5; .37; .037; .03; .012; .00082 G/100ML; G/100ML; G/100ML; G/100ML; G/100ML; G/100ML; G/100ML
1000 INJECTION, SOLUTION INTRAVENOUS ONCE
Status: COMPLETED | OUTPATIENT
Start: 2023-11-26 | End: 2023-11-26

## 2023-11-26 RX ORDER — ONDANSETRON 2 MG/ML
4 INJECTION INTRAMUSCULAR; INTRAVENOUS ONCE
Status: COMPLETED | OUTPATIENT
Start: 2023-11-26 | End: 2023-11-26

## 2023-11-26 RX ORDER — SUCRALFATE 1 G/1
1 TABLET ORAL ONCE
Status: COMPLETED | OUTPATIENT
Start: 2023-11-26 | End: 2023-11-26

## 2023-11-26 RX ORDER — MAGNESIUM HYDROXIDE/ALUMINUM HYDROXICE/SIMETHICONE 120; 1200; 1200 MG/30ML; MG/30ML; MG/30ML
30 SUSPENSION ORAL ONCE
Status: COMPLETED | OUTPATIENT
Start: 2023-11-26 | End: 2023-11-26

## 2023-11-26 RX ORDER — SODIUM CHLORIDE 9 MG/ML
3 INJECTION INTRAVENOUS
Status: DISCONTINUED | OUTPATIENT
Start: 2023-11-26 | End: 2023-11-26 | Stop reason: HOSPADM

## 2023-11-26 RX ORDER — ONDANSETRON 4 MG/1
4 TABLET, FILM COATED ORAL EVERY 6 HOURS
Qty: 12 TABLET | Refills: 0 | Status: SHIPPED | OUTPATIENT
Start: 2023-11-26

## 2023-11-26 RX ADMIN — INSULIN HUMAN 15 UNITS: 100 INJECTION, SOLUTION PARENTERAL at 13:21

## 2023-11-26 RX ADMIN — ONDANSETRON 4 MG: 2 INJECTION INTRAMUSCULAR; INTRAVENOUS at 13:25

## 2023-11-26 RX ADMIN — SUCRALFATE 1 G: 1 TABLET ORAL at 13:25

## 2023-11-26 RX ADMIN — ALUMINUM HYDROXIDE, MAGNESIUM HYDROXIDE, AND DIMETHICONE 30 ML: 200; 20; 200 SUSPENSION ORAL at 13:25

## 2023-11-26 RX ADMIN — SODIUM CHLORIDE, SODIUM GLUCONATE, SODIUM ACETATE, POTASSIUM CHLORIDE, MAGNESIUM CHLORIDE, SODIUM PHOSPHATE, DIBASIC, AND POTASSIUM PHOSPHATE 1000 ML: .53; .5; .37; .037; .03; .012; .00082 INJECTION, SOLUTION INTRAVENOUS at 12:07

## 2023-11-26 NOTE — Clinical Note
Anibal Linton was seen and treated in our emergency department on 11/26/2023. Diagnosis:     Cody Singh  is off the rest of the shift today, may return to work on return date. He may return on this date: 11/28/2023         If you have any questions or concerns, please don't hesitate to call.       Osmin Cast PA-C    ______________________________           _______________          _______________  Hospital Representative                              Date                                Time

## 2023-11-26 NOTE — ED PROVIDER NOTES
History  Chief Complaint   Patient presents with    Abdominal Pain     Patient complaint of abdominal pain and vomiting x 3 days     HPI    24 y/o M w/ DM1 and poorly controlled DM w/ N/V and mild R sided ABD pain ongoing since Friday. Yesterday vomited through lunchtime for 2 hours straight and subsided. Vomiting started again dinner time yesterday . Today tried to eat and vomited again. Noted increasing blood glucose and came to ED for evaluation. Has had DKA in past.      DDX DKA / Viral AGE / Hyperglycemia w/o DKA / pancreatits / gastritis    Prior to Admission Medications   Prescriptions Last Dose Informant Patient Reported? Taking?    Continuous Blood Gluc  (Dexcom G7 ) [facility] 11/26/2023  No Yes   Sig: Use 1 each in the morning   Continuous Blood Gluc Sensor (Dexcom G7 Sensor) 11/26/2023  No Yes   Sig: Use 1 Device every 10 days   Glucagon 1 MG/0.2ML SOAJ 11/26/2023  No Yes   Sig: Inject 1 mg under the skin if needed (hypoglycemic)   acetone, urine, test (Ketostix) strip 11/26/2023  No Yes   Sig: Use 1 strip if needed for high blood sugar   insulin degludec Lollie Balm FlexTouch) 100 units/mL injection pen 11/26/2023  No Yes   Sig: Inject 60 Units under the skin daily Can titrate upto 75u daily, increase 2u every 3 days that fasting BG >150   insulin lispro (HumaLOG KwikPen) 100 units/mL injection pen 11/26/2023  No Yes   Sig: Inject 10-30 Units under the skin 3 (three) times a day with meals Based on carb ratio 1:8-10 and ISF 1:20   lisinopril (ZESTRIL) 10 mg tablet 11/26/2023  Yes Yes   Sig: Take 10 mg by mouth daily      Facility-Administered Medications: None       Past Medical History:   Diagnosis Date    Diabetes mellitus ([address])        Past Surgical History:   Procedure Laterality Date    SINUS SURGERY         Family History   Problem Relation Age of Onset    Diabetes type II Father     Hypertension Father         Triple ByPass    Diabetes type I Maternal Grandmother         Steroid Induced Type 1    Vision loss Maternal Grandmother     Diabetes type II Paternal Grandfather     Hypertension Paternal Grandfather         Quadruple ByPass    Breast cancer Mother     Cancer Maternal Grandfather         Mouth/Throat Cancer    Cancer Paternal Grandmother         Lung Cancer     I have reviewed and agree with the history as documented. E-Cigarette/Vaping    E-Cigarette Use Never User      E-Cigarette/Vaping Substances    Nicotine No     THC No     CBD No     Flavoring No      Social History     Tobacco Use    Smoking status: Never    Smokeless tobacco: Never   Vaping Use    Vaping Use: Never used   Substance Use Topics    Alcohol use: Yes     Alcohol/week: 4.0 standard drinks of alcohol     Types: 4 Cans of beer per week     Comment: Casual drinker -  Light Beer    Drug use: Never       Review of Systems   Constitutional:  Negative for chills and fever. HENT:  Negative for ear pain and sore throat. Eyes:  Negative for pain and visual disturbance. Respiratory:  Negative for cough and shortness of breath. Cardiovascular:  Negative for chest pain and palpitations. Gastrointestinal:  Positive for abdominal pain, nausea and vomiting. Genitourinary:  Negative for dysuria and hematuria. Musculoskeletal:  Negative for arthralgias and back pain. Skin:  Negative for color change and rash. Neurological:  Negative for seizures and syncope. All other systems reviewed and are negative. Physical Exam  Physical Exam  Vitals and nursing note reviewed. Constitutional:       General: He is not in acute distress. Appearance: He is well-developed. HENT:      Head: Normocephalic and atraumatic. Eyes:      Conjunctiva/sclera: Conjunctivae normal.   Cardiovascular:      Rate and Rhythm: Normal rate and regular rhythm. Heart sounds: No murmur heard. Pulmonary:      Effort: Pulmonary effort is normal. No respiratory distress. Breath sounds: Normal breath sounds.    Abdominal: Palpations: Abdomen is soft. Tenderness: There is no abdominal tenderness. Musculoskeletal:         General: No swelling. Cervical back: Neck supple. Skin:     General: Skin is warm and dry. Capillary Refill: Capillary refill takes less than 2 seconds. Neurological:      Mental Status: He is alert.    Psychiatric:         Mood and Affect: Mood normal.         Vital Signs  ED Triage Vitals   Temperature Pulse Respirations Blood Pressure SpO2   11/26/23 1139 11/26/23 1139 11/26/23 1139 11/26/23 1139 11/26/23 1139   (!) 97 °F (36.1 °C) 103 18 153/91 98 %      Temp src Heart Rate Source Patient Position - Orthostatic VS BP Location FiO2 (%)   -- 11/26/23 1300 11/26/23 1300 11/26/23 1300 --    Monitor Sitting Left arm       Pain Score       --                  Vitals:    11/26/23 1139 11/26/23 1300 11/26/23 1400 11/26/23 1500   BP: 153/91 132/80 135/81 145/88   Pulse: 103 98 99 104   Patient Position - Orthostatic VS:  Sitting Sitting          Visual Acuity      ED Medications  Medications   multi-electrolyte (ISOLYTE-S PH 7.4) bolus 1,000 mL (0 mL Intravenous Stopped 11/26/23 1500)   insulin regular (HumuLIN R,NovoLIN R) injection 15 Units (15 Units Intravenous Given 11/26/23 1321)   ondansetron (ZOFRAN) injection 4 mg (4 mg Intravenous Given 11/26/23 1325)   aluminum-magnesium hydroxide-simethicone (MAALOX) oral suspension 30 mL (30 mL Oral Given 11/26/23 1325)   sucralfate (CARAFATE) tablet 1 g (1 g Oral Given 11/26/23 1325)       Diagnostic Studies  Results Reviewed       Procedure Component Value Units Date/Time    Fingerstick Glucose (POCT) [[id number]]  (Abnormal) Collected: 11/26/23 1439    Lab Status: Final result Updated: 11/26/23 1441     POC Glucose 300 mg/dl     Basic metabolic panel [[id number]]  (Abnormal) Collected: 11/26/23 1218    Lab Status: Final result Specimen: Blood from Arm, Right Updated: 11/26/23 1244     Sodium 130 mmol/L      Potassium 4.7 mmol/L      Chloride 95 mmol/L CO2 28 mmol/L      ANION GAP 7 mmol/L      BUN 24 mg/dL      Creatinine 0.83 mg/dL      Glucose 447 mg/dL      Calcium 10.0 mg/dL      eGFR 118 ml/min/1.73sq m     Narrative:      [facility] guidelines for Chronic Kidney Disease (CKD):     Stage 1 with normal or high GFR (GFR > 90 mL/min/1.73 square meters)    Stage 2 Mild CKD (GFR = 60-89 mL/min/1.73 square meters)    Stage 3A Moderate CKD (GFR = 45-59 mL/min/1.73 square meters)    Stage 3B Moderate CKD (GFR = 30-44 mL/min/1.73 square meters)    Stage 4 Severe CKD (GFR = 15-29 mL/min/1.73 square meters)    Stage 5 End Stage CKD (GFR <15 mL/min/1.73 square meters)  Note: GFR calculation is accurate only with a steady state creatinine    Magnesium [[id number]]  (Normal) Collected: 11/26/23 1218    Lab Status: Final result Specimen: Blood from Arm, Right Updated: 11/26/23 1244     Magnesium 2.2 mg/dL     Phosphorus [[id number]]  (Normal) Collected: 11/26/23 1218    Lab Status: Final result Specimen: Blood from Arm, Right Updated: 11/26/23 1244     Phosphorus 3.5 mg/dL     Lactic acid, plasma (w/reflex if result > 2.0) [[id number]]  (Normal) Collected: 11/26/23 1200    Lab Status: Final result Specimen: Blood from Arm, Right Updated: 11/26/23 1243     LACTIC ACID 1.0 mmol/L     Narrative:      Result may be elevated if tourniquet was used during collection.     Comprehensive metabolic panel [[id number]]  (Abnormal) Collected: 11/26/23 1200    Lab Status: Final result Specimen: Blood from Arm, Right Updated: 11/26/23 1241     Sodium 129 mmol/L      Potassium 4.8 mmol/L      Chloride 94 mmol/L      CO2 28 mmol/L      ANION GAP 7 mmol/L      BUN 24 mg/dL      Creatinine 0.83 mg/dL      Glucose 451 mg/dL      Calcium 10.0 mg/dL      AST 10 U/L      ALT 11 U/L      Alkaline Phosphatase 67 U/L      Total Protein 8.1 g/dL      Albumin 4.8 g/dL      Total Bilirubin 0.66 mg/dL      eGFR 118 ml/min/1.73sq m     Narrative:      [employer] guidelines for Chronic Kidney Disease (CKD):     Stage 1 with normal or high GFR (GFR > 90 mL/min/1.73 square meters)    Stage 2 Mild CKD (GFR = 60-89 mL/min/1.73 square meters)    Stage 3A Moderate CKD (GFR = 45-59 mL/min/1.73 square meters)    Stage 3B Moderate CKD (GFR = 30-44 mL/min/1.73 square meters)    Stage 4 Severe CKD (GFR = 15-29 mL/min/1.73 square meters)    Stage 5 End Stage CKD (GFR <15 mL/min/1.73 square meters)  Note: GFR calculation is accurate only with a steady state creatinine    Lipase [[id number]]  (Normal) Collected: 11/26/23 1200    Lab Status: Final result Specimen: Blood from Arm, Right Updated: 11/26/23 1241     Lipase 20 u/L     Phosphorus [[id number]]  (Normal) Collected: 11/26/23 1200    Lab Status: Final result Specimen: Blood from Arm, Right Updated: 11/26/23 1241     Phosphorus 3.5 mg/dL     Magnesium [[id number]]  (Normal) Collected: 11/26/23 1200    Lab Status: Final result Specimen: Blood from Arm, Right Updated: 11/26/23 1241     Magnesium 2.2 mg/dL     UA (URINE) with reflex to Scope [[id number]]  (Abnormal) Collected: 11/26/23 1205    Lab Status: Final result Specimen: Urine, Clean Catch Updated: 11/26/23 1233     Color, UA Light Yellow     Clarity, UA Clear     Specific Gravity, UA <1.005     pH, UA 6.5     Leukocytes, UA Negative     Nitrite, UA Negative     Protein, UA Negative mg/dl      Glucose,  (3/10%) mg/dl      Ketones, UA Trace mg/dl      Urobilinogen, UA <2.0 mg/dl      Bilirubin, UA Negative     Occult Blood, UA Negative    Beta Hydroxybutyrate [[id number]]  (Normal) Collected: 11/26/23 1200    Lab Status: Final result Specimen: Blood from Arm, Right Updated: 11/26/23 1220     BETA-HYDROXYBUTYRATE 0.5 mmol/L     CBC [[id number]]  (Abnormal) Collected: 11/26/23 1200    Lab Status: Final result Specimen: Blood from Arm, Right Updated: 11/26/23 1216     WBC 9.17 Thousand/uL      RBC 5.64 Million/uL      Hemoglobin 15.7 g/dL      Hematocrit 46.7 %      MCV 83 fL      MCH 27.8 pg      MCHC 33.6 g/dL      RDW 12.1 %      Platelets 607 Thousands/uL      MPV 10.7 fL     POCT Blood Gas (CG8+) [[id number]]  (Abnormal) Collected: 11/26/23 1206    Lab Status: Final result Specimen: Venous Updated: 11/26/23 1210     ph, Gerardo ISTAT 7.367     pCO2, Gerardo i-STAT 53.3 mm HG      pO2, Gerardo i-STAT 29.0 mm HG      BE, i-STAT 4 mmol/L      HCO3, Gerardo i-STAT 30.6 mmol/L      CO2, i-STAT 32 mmol/L      O2 Sat, i-STAT 52 %      SODIUM, I-STAT 133 mmol/l      Potassium, i-STAT 5.0 mmol/L      Calcium, Ionized i-STAT 1.27 mmol/L      Hct, i-STAT 46 %      Hgb, i-STAT 15.6 g/dl      Glucose, i-STAT 454 mg/dl      Specimen Type VENOUS    Fingerstick Glucose (POCT) [[id number]]  (Abnormal) Collected: 11/26/23 1139    Lab Status: Final result Updated: 11/26/23 1140     POC Glucose 398 mg/dl                    XR chest 1 view portable   ED Interpretation by Jayant Cee PA-C (11/26 1327)   No acute disease of the chest        Final Result by Carmelita Leonardo MD (11/26 1738)      No acute cardiopulmonary disease. Workstation performed: [id number]                    Procedures  ECG 12 Lead Documentation Only    Date/Time: 11/26/2023 12:06 PM    Performed by: Jayant Cee PA-C  Authorized by: Jayant Cee PA-C    ECG reviewed by me, the ED Provider: yes    Patient location:  ED  Previous ECG:     Previous ECG:  Compared to current    Comparison ECG info:  Compared w/ 9 Oct 2023    Similarity:  No change    Comparison to cardiac monitor: Yes    Interpretation:     Interpretation: normal    Rate:     ECG rate:  103    ECG rate assessment: tachycardic    Rhythm:     Rhythm: sinus rhythm    Ectopy:     Ectopy: none    QRS:     QRS axis:  Normal  Conduction:     Conduction: normal    ST segments:     ST segments:  Normal  T waves:     T waves: normal    Comments:      Self interpreted by me.             ED Course  ED Course as of 11/27/23 [place]   [facility] Nov 26, 2023 Julianna / Elzie Bosworth / SABRINA 5.0   1359 Discussed w/ Endo w/ plan for increase of LA insulin to 70 U / day and planned follow up with the diabetes educator for instructions on carb counting, etc.   1454 Pt tolerated PO challenge. BG improved to 300. Plan for d/c w/ Zofran into OP setting and follow up with Dr. Humera Pastor as per prior plan and discussion with her. She states she plans to have patient set up with her diabetes educator for reducation on diet and carbs counting as she suspects he is not counting nor correcting appropriately. SBIRT 20yo+      Flowsheet Row Most Recent Value   Initial Alcohol Screen: US AUDIT-C     1. How often do you have a drink containing alcohol? 0 Filed at: 11/26/2023 1138   2. How many drinks containing alcohol do you have on a typical day you are drinking? 0 Filed at: 11/26/2023 1138   3a. Male UNDER 65: How often do you have five or more drinks on one occasion? 0 Filed at: 11/26/2023 1138   3b. FEMALE Any Age, or MALE 65+: How often do you have 4 or more drinks on one occassion? 0 Filed at: 11/26/2023 1138   Audit-C Score 0 Filed at: 11/26/2023 1138   ALLISON: How many times in the past year have you. .. Used an illegal drug or used a prescription medication for non-medical reasons? Never Filed at: 11/26/2023 1138                      Medical Decision Making  DKA / Pancreatitis r/o'd. Hyperglycemia w/ possible AGE. Vomiting subsided post Zofran / IVF. Improved blood glucose. Planned f/u in the outpatient setting per discussion w/ Endo Dr. Humera Pastor with need for f/u w/ diabetes educator. Tolerated PO challenge. Non acute presentation. Hyponatremia 134-135 corrected for glucose. RTED instrux provided for any worsening s/s. Rx for Zofran into the OP setting. Amount and/or Complexity of Data Reviewed  Labs: ordered. Radiology: ordered and independent interpretation performed. Risk  OTC drugs. Prescription drug management. Disposition  Final diagnoses:   Hyperglycemia   Nausea and vomiting   Type 1 diabetes ([address])   Poorly controlled type 1 diabetes mellitus ([address])     Time reflects when diagnosis was documented in both MDM as applicable and the Disposition within this note       Time User Action Codes Description Comment    11/26/2023 12:47 PM Cano Matte Add [R73.9] Hyperglycemia     11/26/2023  2:53 PM Cano Matte Add [R11.2] Nausea and vomiting     11/26/2023  2:55 PM Cano Matte Add [E10.9] Type 1 diabetes ([address])     11/26/2023  3:03 PM Cano Matte Add [E10.65] Poorly controlled type 1 diabetes mellitus [facility])           ED Disposition       ED Disposition   Discharge    Condition   Stable    Date/Time   Sun Nov 26, 2023 [facility]    [facility] [facility] discharge to home/self care. Follow-up Information       Follow up With Specialties Details Why Chace Peters MD Endocrinology Call today Call tomorrow for follow up and to schedule  session with the diabetes education department. [address] [facility], [address] [place] [address], Nurse Practitioner Call today Call today also to schedule a follow up appointment.  [id number] [address]  [phone]              Discharge Medication List as of 11/26/2023  3:04 PM        START taking these medications    Details   ondansetron (ZOFRAN) 4 mg tablet Take 1 tablet (4 mg total) by mouth every 6 (six) hours, Starting Sun 11/26/2023, Normal           CONTINUE these medications which have NOT CHANGED    Details   acetone, urine, test (Ketostix) strip Use 1 strip if needed for high blood sugar, Starting Mon 11/6/2023, Normal      Continuous Blood Gluc  (Dexcom G7 ) CORI Use 1 each in the morning, Starting Mon 11/6/2023, Normal      Continuous Blood Gluc Sensor (Dexcom G7 Sensor) Use 1 Device every 10 days, Starting Mon 11/6/2023, Normal      Glucagon 1 MG/0.2ML SOAJ Inject 1 mg under the skin if needed (hypoglycemic), Starting Mon 11/6/2023, Normal      insulin degludec Shakira Fell FlexTouch) 100 units/mL injection pen Inject 60 Units under the skin daily Can titrate upto 75u daily, increase 2u every 3 days that fasting BG >150, Starting Mon 11/6/2023, Normal      insulin lispro (HumaLOG KwikPen) 100 units/mL injection pen Inject 10-30 Units under the skin 3 (three) times a day with meals Based on carb ratio 1:8-10 and ISF 1:20, Starting Tue 11/21/2023, Fill Later      lisinopril (ZESTRIL) 10 mg tablet Take 10 mg by mouth daily, Historical Med                 PDMP Review       None            ED Provider  Electronically Signed by             Waleska Dyson PA-C  11/27/23 0716

## 2023-11-28 ENCOUNTER — TELEPHONE (OUTPATIENT)
Dept: FAMILY MEDICINE CLINIC | Facility: HOSPITAL | Age: 28
End: 2023-11-28

## 2023-11-28 NOTE — TELEPHONE ENCOUNTER
Left msg for patient to return call to office regarding diabetic eye exam. We are looking for records from his diabetic eye exam and what location of [facility] he visited. When patient was in for appt 11/6 he let us know he was going to [address] in November. Dale Johnson

## 2023-12-03 DIAGNOSIS — E10.10 DKA, TYPE 1 (HCC): ICD-10-CM

## 2023-12-04 RX ORDER — INSULIN LISPRO 100 [IU]/ML
10-30 INJECTION, SOLUTION INTRAVENOUS; SUBCUTANEOUS
Qty: 45 ML | Refills: 0 | Status: SHIPPED | OUTPATIENT
Start: 2023-12-04

## 2023-12-07 ENCOUNTER — OFFICE VISIT (OUTPATIENT)
Dept: DIABETES SERVICES | Facility: HOSPITAL | Age: 28
End: 2023-12-07
Payer: COMMERCIAL

## 2023-12-07 VITALS — HEIGHT: 75 IN | BODY MASS INDEX: 35.19 KG/M2 | WEIGHT: 283 LBS

## 2023-12-07 DIAGNOSIS — E10.65 POORLY CONTROLLED TYPE 1 DIABETES MELLITUS (HCC): Primary | ICD-10-CM

## 2023-12-07 PROCEDURE — 98960 EDU&TRN PT SELF-MGMT NQHP 1: CPT | Performed by: DIETITIAN, REGISTERED

## 2023-12-07 NOTE — PROGRESS NOTES
Carb counting and Flexible insulin    Assessment    Visit Type: Initial visit  Chief complaint:  Type 1    HPI:   Met with Pepe and his girlfriend for carb counting and flexible insulin. He has had diabetes since childhood. Blood sugars had typically been under decent control he states, then he had gotten sick and they became more erratic and have really never improved from there. Recent A1c 9.4%, which is higher than it normally is. Reviewed download from [facility] together. In goal range 35%, high 32%, very high 31%, low 1%. Blood sugars consistently run very high overnight and then improved during the day. He was previously on a Medtronic pump but had issues with it staying on due to his job. He is now working a desk job and would like to get back on track with getting a pump. We do have a pump assessment scheduled for the new year. I do think he would do very well with a Medtronic or t:slim pump. I evaluated his carbohydrate counting skills, made adjustments were needed. He is already doing flexible insulin therapy, I reviewed those skills and his math and he is doing that correctly. He will work on improved carbohydrate counting skills and we would like to move forward with him getting a new pump. He comes back to see endocrinology in a couple weeks. He will work on some of the diet changes we discussed and see if that makes improvements to his blood sugars and this time. I did give him a copy of the food log, he will complete 3 days with a food log and bring that with him at time of the endocrinology visit for me to review and evaluate, and proceed through the pump process through there. He will call with questions or for more education as needed. Ht Readings from Last 1 Encounters:   12/07/23 6' 3" (1.905 m)     Wt Readings from Last 3 Encounters:   12/07/23 128 kg (283 lb)   11/21/23 128 kg (283 lb)   11/06/23 129 kg (283 lb 6.4 oz)        Body mass index is 35.37 kg/m².      Lab Results Component Value Date    HGBA1C 9.4 (H) 09/30/2023       No results found for: "CHOL"  No results found for: "HDL"  No results found for: "LDLCALC"  No results found for: "TRIG"  No results found for: "CHOLHDL"    Weight Change: No    Medical Diagnosis/ICD 10 Code:  E10.65    Barriers to Learning: no barriers    Do you follow any special diet presently?: Yes - watching carbs  Who shops: patient  Who cooks: patient and spouse    Food Log: Completed via the method of food recall- works 11am-8:30pm    Breakfast:up around 5:30am-7:45am. Goes to the gym 6-7, eats 8am. Eggs 2-3 and 2 toast what's on the package and apple 13g. Takes insuln before eating. Morning Snack:11am varies, apple about 3hrs later, or fruit leather, nuts, cheese, meat. Insulin if there is a carb in the snack   Lunch:about 3 hours later 2pmish-3pm: leftovers chicken rice, mission low carb wraps quesedilla. Afternoon Snack: 5-6pm: same as morning snack. Cutie, apple, cheese and meat. Dinner:8-9pm: meat starch veggie hot meal, lots of chicken and turkey, rice sweet potato and veggie. Carbs vary here. Evening Snack:none. Bed 10-11pm  Beverages: water, rare diet soda, coke zero, rare gatorade zero, energy drink no sugar, rare milk and measures it out. Eating out/Take out: rare, twice a month. Exercise gym 4 days a week.      Nutrition Diagnosis:  Food and nutrition related knowledge deficit  related to Lack of prior exposure to accurate nutrition related information as evidenced by Verbalizes inaccurate or incomplete information    Intervention: plate method, label reading, behavior modification strategies, carbohydrate counting, meal planning, individualized meal plan, and monitoring portion control     Treatment Goals: Patient understands education and recommendations    Education Material Given  Isai Jones was provided the Portion Booklet and Planning Healthy Meals     Monitoring and evaluation:    Term code indicator  FH 1.6.3 Carbohydrate Intake Criteria: 30-45g CHO per meal, 15g CHO snacks    Patient’s Response to Instruction:  Laureano Leroy  Expected Compliancegood    Thank you for coming to the [address] [place] [address]  for education today. Please feel free to call with any questions or concerns.     Caren Saleem, [address] [address]  [address] [facility] [id number]

## 2023-12-13 LAB
ALBUMIN SERPL-MCNC: 4.4 G/DL (ref 3.6–5.1)
ALBUMIN/CREAT UR: 4 MCG/MG CREAT
ALBUMIN/GLOB SERPL: 1.6 (CALC) (ref 1–2.5)
ALP SERPL-CCNC: 72 U/L (ref 36–130)
ALT SERPL-CCNC: 11 U/L (ref 9–46)
AST SERPL-CCNC: 9 U/L (ref 10–40)
BILIRUB SERPL-MCNC: 0.4 MG/DL (ref 0.2–1.2)
BUN SERPL-MCNC: 26 MG/DL (ref 7–25)
BUN/CREAT SERPL: 35 (CALC) (ref 6–22)
CALCIUM SERPL-MCNC: 9.4 MG/DL (ref 8.6–10.3)
CHLORIDE SERPL-SCNC: 99 MMOL/L (ref 98–110)
CHOLEST SERPL-MCNC: 133 MG/DL
CHOLEST/HDLC SERPL: 4.2 (CALC)
CO2 SERPL-SCNC: 31 MMOL/L (ref 20–32)
CREAT SERPL-MCNC: 0.74 MG/DL (ref 0.6–1.26)
CREAT UR-MCNC: 221 MG/DL (ref 20–320)
GFR/BSA.PRED SERPLBLD CYS-BASED-ARV: 125 ML/MIN/1.73M2
GLOBULIN SER CALC-MCNC: 2.8 G/DL (CALC) (ref 1.9–3.7)
GLUCOSE SERPL-MCNC: 191 MG/DL (ref 65–99)
HBA1C MFR BLD: 8.2 % OF TOTAL HGB
HCV AB SERPL QL IA: NORMAL
HDLC SERPL-MCNC: 32 MG/DL
HIV 1+2 AB+HIV1 P24 AG SERPL QL IA: NORMAL
LDLC SERPL CALC-MCNC: 71 MG/DL (CALC)
MICROALBUMIN UR-MCNC: 0.8 MG/DL
NONHDLC SERPL-MCNC: 101 MG/DL (CALC)
POTASSIUM SERPL-SCNC: 4.7 MMOL/L (ref 3.5–5.3)
PROT SERPL-MCNC: 7.2 G/DL (ref 6.1–8.1)
SODIUM SERPL-SCNC: 136 MMOL/L (ref 135–146)
TRIGL SERPL-MCNC: 201 MG/DL
TSH SERPL-ACNC: 1.78 MIU/L (ref 0.4–4.5)

## 2023-12-16 LAB
LEFT EYE DIABETIC RETINOPATHY: POSITIVE
RIGHT EYE DIABETIC RETINOPATHY: POSITIVE

## 2023-12-20 NOTE — PROGRESS NOTES
"  Follow up Progress note    Chief Complaint   Patient presents with    Diabetes Type 1     History of Present Illness:   Pepe Lynch is a 28 y.o. male with a history of type 1 diabetes since age 15, Without any complications  with other PMHx of hypertension and class 2 obesity who presents today for diabetes management. Previously managed by endocrine at [facility] 4 years ago but d/t insurance issues couldn't follow up further. Reports was on OTC insulin from [facility] d/t insurance issues and had recent episode of DKA 09/29/23 d/t viral illness who presents today for follow up.     Since last visit, patient went to ED again for hyperglycemia without being in DKA. Discharged with mild adjustment to his regime. Also seen by CDE on 12/07 for pump assessment and also keeping food diary to assess carb counting. Reports also interested in pump and waiting on new insurance to tell him which is approved.     Patient was first diagnosed with T1DM- age 15, based on routine blood work  Control since diagnosis: no previous Hx, recent A1C 9.4%  Medications tried thus far: MDI, was on medtronic pump in past, d/c d/t sweating when working in [employer]  Current regime:-   - Tresiba 70u   - Humalog 1:8u bk and dinner and 1:10 for lunch, and 1:20 ISF goal 120    BG control:- Pepe Lynch   Device used Dexcom G7    Indication   Type 1 Diabetes    More than 72 hours of data was reviewed. Report to be scanned to chart.     Date Range:   Analysis of data: Dec 7- dec 20 2023  Average Glucose: 210  Time in Target Range: 37% (38%)   Time Above Range: 34%/29% (35%/26%)  Time Below Range: 0% (<1%)    Interpretation of data: high BG during the day without any major PPH    Hypoglycemic episodes: not often  Hyperglycemia symptoms: no polyuria or polydipsia\",\"no chest pain, dyspnea or TIAs\",\"no numbness, tingling or pain in extremities\"  Diet: has been eating better with 45g per meal and 15g per snack  Activity: stays " active    HOSPITALIZATIONS:   Hospitalizations or ED visit for DKA: 09/29/23  Hospitalizations/ED visits since the last office visit: none  Hospitalizations or ED visit for hypoglycemia: none  Hospitalizations/ED visits since the last office visit: None  Date of last hospitalization/ED visit: 09/29/23    COMPLICATIONS OF DIABETES:   Eye disease in the past: None, hasn't has exam in 2 years  Nephropathy: denies, no labs    Peripheral neuropathy:  None  History of foot sores/infection: None  Gastroparesis: None  Hypoglycemia awareness: Yes  Hypoglycemic seizure: No  Fatty Liver disease: Unknown  Cardiovascular disease: No  Cerebrovascular disease: No  Peripheral Vascular: No  Hx of hypertension: Yes  Last Lipid:- 12/23 LDL 71,   Last urine microalbumin: 12/23 normal  Last hbA1C: 12/23 8.2%, 9.4% 09/23      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, occasional alcohol use, works as a dispature  Family HX:- mostly T2DM    Patient Active Problem List   Diagnosis    DKA, type 1 (HCC)    HTN (hypertension)    Tachycardia    BMI 36.0-36.9,adult      Past Medical History:   Diagnosis Date    Diabetes mellitus (HCC)       Past Surgical History:   Procedure Laterality Date    SINUS SURGERY        Family History   Problem Relation Age of Onset    Diabetes type II Father     Hypertension Father         Triple ByPass    Diabetes type I Maternal Grandmother         Steroid Induced Type 1    Vision loss Maternal Grandmother     Diabetes type II Paternal Grandfather     Hypertension Paternal Grandfather         Quadruple ByPass    Breast cancer Mother     Cancer Maternal Grandfather         Mouth/Throat Cancer    Cancer Paternal Grandmother         Lung Cancer     Social History     Tobacco Use    Smoking status: Never    Smokeless tobacco: Never   Substance Use Topics    Alcohol use: Yes     Alcohol/week: 4.0 standard drinks of alcohol     Types: 4 Cans of beer per week     Comment: Casual drinker -  Light Beer     No Known  "Allergies      Current Outpatient Medications:     acetone, urine, test (Ketostix) strip, Use 1 strip if needed for high blood sugar, Disp: 25 each, Rfl: 0    Continuous Blood Gluc  (Dexcom G7 ) CORI, Use 1 each in the morning, Disp: 1 each, Rfl: 0    Continuous Blood Gluc Sensor (Dexcom G7 Sensor), Use 1 Device every 10 days, Disp: 10 each, Rfl: 1    Glucagon 1 MG/0.2ML SOAJ, Inject 1 mg under the skin if needed (hypoglycemic), Disp: 0.2 mL, Rfl: 1    insulin degludec (Tresiba FlexTouch) 100 units/mL injection pen, Inject 60 Units under the skin daily Can titrate upto 75u daily, increase 2u every 3 days that fasting BG >150 (Patient taking differently: Inject 60 Units under the skin daily Can titrate upto 75u daily, increase 2u every 3 days that fasting BG >150 Pt states changed to 70), Disp: 15 mL, Rfl: 1    insulin lispro (HumaLOG KwikPen) 100 units/mL injection pen, Inject 10-30 Units under the skin 3 (three) times a day with meals Based on carb ratio 1:8-10 and ISF 1:20 (Patient taking differently: Inject 10-30 Units under the skin 3 (three) times a day with meals Based on carb ratio 1:8-10-8 and ISF 1:20), Disp: 45 mL, Rfl: 0    lisinopril (ZESTRIL) 10 mg tablet, Take 10 mg by mouth daily, Disp: , Rfl:     ondansetron (ZOFRAN) 4 mg tablet, Take 1 tablet (4 mg total) by mouth every 6 (six) hours, Disp: 12 tablet, Rfl: 0  Review of Systems   Constitutional:  Negative for diaphoresis, fatigue and unexpected weight change.   Respiratory:  Negative for shortness of breath.    Cardiovascular:  Negative for chest pain and palpitations.   Gastrointestinal:  Negative for constipation and diarrhea.   Endocrine: Negative for polydipsia and polyuria.       Physical Exam:  Body mass index is 32.87 kg/m².  /68   Pulse (!) 114   Ht 6' 3\" (1.905 m)   Wt 119 kg (263 lb)   SpO2 99%   BMI 32.87 kg/m²    Wt Readings from Last 3 Encounters:   12/21/23 119 kg (263 lb)   12/07/23 128 kg (283 lb)   11/21/23 " 128 kg (283 lb)       Physical Exam  Constitutional:       Appearance: Normal appearance. He is obese.   Cardiovascular:      Rate and Rhythm: Normal rate and regular rhythm.      Pulses: Normal pulses.   Pulmonary:      Effort: Pulmonary effort is normal.   Abdominal:      General: Abdomen is flat.      Palpations: Abdomen is soft.   Skin:     General: Skin is warm.      Capillary Refill: Capillary refill takes less than 2 seconds.   Neurological:      General: No focal deficit present.      Mental Status: He is alert.         Labs:    Latest Reference Range & Units Most Recent   Hemoglobin A1C Normal 4.0-5.6%; PreDiabetic 5.7-6.4%; Diabetic >=6.5%; Glycemic control for adults with diabetes <7.0% % 9.4 (H)  9/30/23 04:51   eAG, EST AVG Glucose mg/dl 223  9/30/23 04:51   POC Glucose 65 - 140 mg/dl 351 (H)  10/9/23 22:56   (H): Data is abnormally high   See labs above  Impression:  No diagnosis found.       Plan:    There are no diagnoses linked to this encounter.    1. DKA, type 1 (HCC)    - Ambulatory Referral to Endocrinology  - Hemoglobin A1C; Future  - Comprehensive metabolic panel; Future  - Albumin / creatinine urine ratio; Future  - Lipid panel; Future  - Ambulatory referral to Diabetic Education; Future  - Continuous Blood Gluc  (Dexcom G7 ) OCRI; Use 1 each in the morning  Dispense: 1 each; Refill: 0  - Continuous Blood Gluc Sensor (Dexcom G7 Sensor); Use 1 Device every 10 days  Dispense: 10 each; Refill: 1  - insulin degludec (Tresiba FlexTouch) 100 units/mL injection pen; Inject 60 Units under the skin daily Can titrate upto 75u daily, increase 2u every 3 days that fasting BG >150  Dispense: 15 mL; Refill: 1  - insulin lispro (HumaLOG KwikPen) 100 units/mL injection pen; Inject 10 Units under the skin 3 (three) times a day with meals  Dispense: 45 mL; Refill: 1  - acetone, urine, test (Ketostix) strip; Use 1 strip if needed for high blood sugar  Dispense: 25 each; Refill: 0  - Glucagon 1  MG/0.2ML SOAJ; Inject 1 mg under the skin if needed (hypoglycemic)  Dispense: 0.2 mL; Refill: 1    2. Primary hypertension  3. BMI 36.0-36.9,adult    Patient is a 28y With PMHx of T1DM since 15 without any complications with Hx of DKA with unclear etiology, with Other PMHx of hypertension and class 2 obesity who presents today for diabetic care.     1) T1DM:- patients HbA1C has improved from 9.4% to 8.2% now which is much better. CGM does still show overnight high's possible d/t insulin resistance d/t obesity. Discussed should continue to work towards weight loss to improve this. For now will increase tresiba further titrate by 2u every 3 days to aim for fasting BG <120 upto 85u. No changes to CR. Visit back in 6 weeks     Plan   - Tresiba 70u + titration  - Humalog 1:8u bk and dinner and 1:10 for lunch, and 1:20 ISF goal 120    Screening:-   Retinopathy- not UTD will perform before next visit  Nephropathy= UTD normal  Lipide levels- UTD- discussed TG elevated at 201, needs to work on low fat diet to improve this. Repeat in 3 months     2) Hypertension:- BP in clinic 120/68, cw current regime    3) Hyperlipidemia:- see above    RTC in 6 weeks     Discussed with the patient and all questioned fully answered. He will call me if any problems arise.    Counseled patient on diagnostic results, prognosis, risk and benefit of treatment options, instruction for management, importance of treatment compliance, Risk  factor reduction and impressions        Xenia Dudley MD

## 2023-12-21 ENCOUNTER — OFFICE VISIT (OUTPATIENT)
Dept: ENDOCRINOLOGY | Facility: HOSPITAL | Age: 28
End: 2023-12-21
Payer: COMMERCIAL

## 2023-12-21 VITALS
HEIGHT: 75 IN | SYSTOLIC BLOOD PRESSURE: 120 MMHG | HEART RATE: 114 BPM | DIASTOLIC BLOOD PRESSURE: 68 MMHG | BODY MASS INDEX: 32.7 KG/M2 | WEIGHT: 263 LBS | OXYGEN SATURATION: 99 %

## 2023-12-21 DIAGNOSIS — E10.10 DKA, TYPE 1 (HCC): ICD-10-CM

## 2023-12-21 DIAGNOSIS — E10.10 TYPE 1 DIABETES MELLITUS WITH KETOACIDOSIS WITHOUT COMA (HCC): Primary | ICD-10-CM

## 2023-12-21 DIAGNOSIS — I10 PRIMARY HYPERTENSION: ICD-10-CM

## 2023-12-21 PROCEDURE — 99214 OFFICE O/P EST MOD 30 MIN: CPT | Performed by: STUDENT IN AN ORGANIZED HEALTH CARE EDUCATION/TRAINING PROGRAM

## 2023-12-21 RX ORDER — INSULIN DEGLUDEC INJECTION 100 U/ML
70 INJECTION, SOLUTION SUBCUTANEOUS DAILY
Qty: 45 ML | Refills: 1 | Status: SHIPPED | OUTPATIENT
Start: 2023-12-21

## 2023-12-21 RX ORDER — INSULIN DEGLUDEC INJECTION 100 U/ML
70 INJECTION, SOLUTION SUBCUTANEOUS DAILY
Qty: 45 ML | Refills: 1 | Status: SHIPPED | OUTPATIENT
Start: 2023-12-21 | End: 2023-12-21 | Stop reason: SDUPTHER

## 2023-12-21 RX ORDER — INSULIN LISPRO 100 [IU]/ML
10-30 INJECTION, SOLUTION INTRAVENOUS; SUBCUTANEOUS
Qty: 45 ML | Refills: 1 | Status: SHIPPED | OUTPATIENT
Start: 2023-12-21

## 2024-01-01 ENCOUNTER — AMB VIDEO VISIT (OUTPATIENT)
Dept: OTHER | Facility: HOSPITAL | Age: 29
End: 2024-01-01

## 2024-01-01 PROCEDURE — ECARE PR SL URGENT CARE VIRTUAL VISIT: Performed by: INTERNAL MEDICINE

## 2024-01-01 NOTE — CARE ANYWHERE EVISITS
Visit Summary for SARAH DURON - Gender: Male - Date of Birth: 1995  Date: [id number] - Duration: 2 minutes  Patient: SARAH DURON  Provider: Holly Paul    Patient Contact Information  Address  [address]  [place]; [place] [address]  [phone]    Visit Topics  Congestion w/ yellowish green mucus. Coughing, right ear ache, sore throat. Covid test negative .  [Added By: Self - 2024-01-01]    Triage Questions   What is your current physical address in the event of a medical emergency? Answer []  Are you allergic to any medications? Answer []  Are you now or could you be pregnant? Answer []  Do you have any immune system compromise or chronic lung   disease? Answer []  Do you have any vulnerable family members in the home (infant, pregnant, cancer, elderly)? Answer []     Conversation Transcripts  [0A][0A] [Notification] Omar Hernandez, Global Staff, will help you prepare for your visit. He is assisting Holly Paul, Adult Medicine.[0A][Omar Hernandez] Da, and thank you for connecting. While you are waiting for the doctor, are there any   questions I can answer for you about our service? Please contact customer service if you have questions about billing, insurance, or technical issues. Visits work best with a stable WiFi connection, so please make sure you are connected before we   begin.[0A][Notification] Omar Hernandez has left the room.[0A][Notification] You are connected with Holly Paul, Adult Medicine.[0A][Notification] SARAH DURON is located in [place].[0A][Notification] SARAH DURON has shared health   history...[0A][Notification] Holly Paul has added a note.[0A][Notification] Holly Paul has modified a note.[0A]    Diagnosis  Cough, unspecified    Procedures  Value: 31906 Code: CPT-4 UNLISTED E&M SERVICE    Medications Prescribed    amoxicillin-pot clavulanate  Dose : 1 tablet  Route : oral  Frequency : every 12 hours. Until directed to stop.     Refills : 0  Instructions to  the Pharmacist : Substitutions allowed      Provider Notes  [0A][0A] Mode of Communication: audio[0A]History of Present Illness[0A]         Pepe presented with ,  head congestion, pain, a sore throat and a wet cough. deom with mucous and green  He has been sick for  about 14 days.. He is feeling worse and   believes he has a sinus infection[0A]Past Medical History: well diabetes[0A]Medications: reviewed humol[0A]Allergies: nkda[0A]PHYSICAL EXAM: The following is taken from my personal audible examination of Pt CONSTITUTIONAL: Pt is not in any acute distress   - is speaking in full sentences.[0A]ORAL/OROPHARYNGEAL: . No audible hoarseness.[0A]RESPIRATORY: Negative for audible wheezing, cough and conversational dyspnea or stridor. [0A]PSYCH: Appropriate affect. Speech is coherent in casual conversation.[0A]The   remainder of the physical exam is noncontributory[0A]Assessment sinusitis , possible secondary bacterial infection[0A](Diagnosis code: consider J01.90 Acute sinusitis, unspecified, [0A]Plan  rest, increase fluids[0A]1.    Medications: augmentin 875 mg 1   pill twice a day for 7 days[0A]2.    MDM: worsening symptoms/augmentin is a first line treatment[0A]        [0A]3.    Home care:[0A]        a.    Rest, fluids, saline drops and menthol[0A]        b.    Pain relievers: Unless otherwise noted and as long   as there's no reason you should not take these consider Acetaminophen, Ibuprofen with food if needed[0A]        c.    For the  cough..congestion: consider Mucinex on another guaifenesin containing mucus-thinning medicine, nasal saline rinses, humidifier,   nasal steroids such as Nasacort, Nasonex, Rhinocort, Flonase or their generic versions). Be aware that these can take 2-4 days to become effective.[0A]4.    please call back or your pcp if needed[0A]Additional recommendations[0A]1.    If you received a   prescription at this visit and you have a question or problem, please call [phone] for  prescription assistance.[0A]2.    Please print a copy of this note and send it to your regular doctor or take it to your next visit so it may be included in your   medical record.[0A]3.    Please see your primary care provider on an annual basis or more frequently if directed.[0A][0A]    Electronically signed by: Holly Paul(NPI [id number])

## 2024-01-09 ENCOUNTER — OFFICE VISIT (OUTPATIENT)
Dept: DIABETES SERVICES | Facility: HOSPITAL | Age: 29
End: 2024-01-09
Attending: STUDENT IN AN ORGANIZED HEALTH CARE EDUCATION/TRAINING PROGRAM
Payer: COMMERCIAL

## 2024-01-09 VITALS — WEIGHT: 263 LBS | HEIGHT: 75 IN | BODY MASS INDEX: 32.7 KG/M2

## 2024-01-09 DIAGNOSIS — E10.10 TYPE 1 DIABETES MELLITUS WITH KETOACIDOSIS WITHOUT COMA (HCC): Primary | ICD-10-CM

## 2024-01-09 PROCEDURE — 98960 EDU&TRN PT SELF-MGMT NQHP 1: CPT | Performed by: DIETITIAN, REGISTERED

## 2024-01-09 RX ORDER — INSULIN LISPRO 100 [IU]/ML
150 INJECTION, SOLUTION INTRAVENOUS; SUBCUTANEOUS CONTINUOUS
Qty: 30 ML | Refills: 1 | Status: SHIPPED | OUTPATIENT
Start: 2024-01-09

## 2024-01-09 NOTE — Clinical Note
Please order: Tslim Control IQ pump, autosoft xc infusion sets, 6mm cannula, 23inch tube, EVERY 2 DAY CHANGE.   Also needs script for vials of mealtime insulin. Thanks! -Mireya

## 2024-01-09 NOTE — PROGRESS NOTES
"Pump Assessment    HPI: Met with [employer] for DSME Follow-up visit.  Here today for initial insulin pump assessment. Patient is potentially interested in a pump.  Today we reviewed the basics of insulin pump therapy concepts.  We reviewed and looked at the three primary insulin pumps- Medtronic, Tslim, and Omnipod pumps. Reviewed pros and cons of each, special features, automation, CGM connectivity, etc.  Pepe Lynch has a good understanding of the pump options available and can make informed decision that is best for them. At this time, Pepe Lynch is leaning towards the Tslim insulin pump. When ready, they will contact the pump company to start the ordering process and insurance check.    Patient was screened for carb counting knowledge and flexible insulin therapy needs.    Prior to starting on their pump, Pepe Lynch would benefit from diabetes education visits for the following: none, we have completed carb counting and he is doing flexible insulin, I have confirmed his skills, he is ready to go.     Please order: Tslim Control IQ pump, autosoft xc infusion sets, 6mm cannula, 23inch tube, EVERY 2 DAY CHANGE.     They will call with questions or for more education, and will contact us when their pump arrives to schedule training with our education department.       Ht Readings from Last 1 Encounters:   01/09/24 6' 3\" (1.905 m)     Wt Readings from Last 3 Encounters:   01/09/24 119 kg (263 lb)   12/21/23 119 kg (263 lb)   12/07/23 128 kg (283 lb)        Body mass index is 32.87 kg/m².     Lab Results   Component Value Date    HGBA1C 8.2 (H) 12/13/2023    HGBA1C 9.4 (H) 09/30/2023       No results found for: \"CHOL\"  Lab Results   Component Value Date    HDL 32 (L) 12/13/2023     Lab Results   Component Value Date    LDLCALC 71 12/13/2023     Lab Results   Component Value Date    TRIG 201 (H) 12/13/2023     No results found for: \"CHOLHDL\"  No results found for: \"MICROALBUR\", " "\"[id number]\"    Diabetes Education Record  Pepe received the following handouts: none today       Patient response to instruction    Comprehensiongood  Motivationgood  Expected Compliancegood    Thank you for referring your patient to [facility], it was a pleasure working with them today. Please feel free to call with any questions or concerns.    Mireya Han, RD  [address] [address]    "

## 2024-01-10 LAB
DME PARACHUTE DELIVERY DATE REQUESTED: NORMAL
DME PARACHUTE DELIVERY NOTE: NORMAL
DME PARACHUTE ITEM DESCRIPTION: NORMAL
DME PARACHUTE ORDER STATUS: NORMAL
DME PARACHUTE SUPPLIER NAME: NORMAL
DME PARACHUTE SUPPLIER PHONE: NORMAL

## 2024-01-16 ENCOUNTER — AMB VIDEO VISIT (OUTPATIENT)
Dept: OTHER | Facility: HOSPITAL | Age: 29
End: 2024-01-16

## 2024-01-16 VITALS — TEMPERATURE: 98 F | RESPIRATION RATE: 18 BRPM | HEART RATE: 76 BPM

## 2024-01-16 DIAGNOSIS — J01.40 ACUTE PANSINUSITIS, RECURRENCE NOT SPECIFIED: Primary | ICD-10-CM

## 2024-01-16 PROBLEM — E10.10 DKA, TYPE 1 (HCC): Status: RESOLVED | Noted: 2023-09-29 | Resolved: 2024-01-16

## 2024-01-16 PROBLEM — E10.9 TYPE 1 DIABETES MELLITUS (HCC): Status: ACTIVE | Noted: 2018-10-05

## 2024-01-16 PROCEDURE — ECARE PR SL URGENT CARE VIRTUAL VISIT: Performed by: PHYSICIAN ASSISTANT

## 2024-01-16 NOTE — PROGRESS NOTES
Required Documentation:  Encounter provider Shannon D Severino, PA-C    Provider located at [facility]  VIRTUAL CARE   [address] [address]    Identify all parties in room with patient during virtual visit:  No one else    The patient was identified by name and date of birth. Pepe Lynch was informed that this is a telemedicine visit and that the visit is being conducted through the [facility] VAZATA platform. He agrees to proceed..  My office door was closed. No one else was in the room.  He acknowledged consent and understanding of privacy and security of the video platform. The patient has agreed to participate and understands they can discontinue the visit at any time.    Verification of patient location:    Patient is located at home in the following state in which I hold an active license [place]    Patient is aware this is a billable service.     Reason for visit is No chief complaint on file.       Subjective  HPI   Pt reports 1/1 had a VV, was given amox-clav, cleared up after 7 days. Then 5 days later started with congestion, cough at night time, sore throat L > R with L earache, green-yellow nasal discharge, has diffuse sinus pressure. Total of 3 days of sx worsening since last night. Taking dayquil, afrin without relief. No fevers, CP, SOB. COVID negative. Blood sugars have been elevated since having DKA in September 2023. Reports have been about 200 for the past few days.     Past Medical History:   Diagnosis Date    Diabetes mellitus (HCC)        Past Surgical History:   Procedure Laterality Date    SINUS SURGERY          No Known Allergies    Review of Systems   Constitutional:  Negative for fever.   HENT:  Positive for congestion, ear pain, postnasal drip, rhinorrhea, sinus pressure, sinus pain and sore throat. Negative for nosebleeds.    Eyes:  Negative for redness.   Respiratory:  Positive for cough. Negative for shortness of breath.     Cardiovascular:  Negative for chest pain.   Gastrointestinal:  Negative for blood in stool.   Genitourinary:  Negative for hematuria.   Musculoskeletal:  Negative for gait problem.   Skin:  Negative for rash.   Neurological:  Negative for seizures.   Psychiatric/Behavioral:  Negative for behavioral problems.        Video Exam    Vitals:    01/16/24 0944   Pulse: 76   Resp: 18   Temp: 98 °F (36.7 °C)       Physical Exam  Constitutional:       General: He is not in acute distress.     Appearance: Normal appearance. He is not toxic-appearing.   HENT:      Head: Normocephalic and atraumatic.      Nose: No rhinorrhea.      Comments: Sounds mildly congested     Mouth/Throat:      Mouth: Mucous membranes are moist.   Eyes:      Conjunctiva/sclera: Conjunctivae normal.      Comments: glasses   Cardiovascular:      Rate and Rhythm: Normal rate.   Pulmonary:      Effort: Pulmonary effort is normal. No respiratory distress.      Breath sounds: No wheezing (no gross audible wheeze through computer).   Musculoskeletal:      Cervical back: Normal range of motion.   Skin:     Findings: No rash (on face or neck).   Neurological:      Mental Status: He is alert.      Cranial Nerves: No dysarthria or facial asymmetry.   Psychiatric:         Mood and Affect: Mood normal.         Behavior: Behavior normal.         Visit Time  Total Visit Duration: 10 minutes    Assessment/Plan:    Diagnoses and all orders for this visit:    Acute pansinusitis, recurrence not specified        Patient Instructions   As discussed, sinus infections are typically viral and will get better on their own in 7-10 days. You should try symptomatic relief in the meantime with OTC (Claritin or Zyrtec), Afrin up to 3 days then switch to Flonase, and Sudafed(behind the counter) and mucinex. You can also try sinus rinses with a neti pot or nasal lavage (be sure to use distilled water.) Sleep with a cool mist humidifier at your bedside. If your symptoms do not  "improve after 7-10 days, you may need antibiotics because it is more likely to be bacterial at that point.  Follow-up with your primary care physician for recheck in 2-3 business days. Go to the ER for sudden severe headache, high fevers, change in vision, seizure activity or anything else that is concerning.    [facility] Phone number is [phone] if you need assistance or have further questions  note in \"Letters\" section of [facility]ma. Can print if opened from a web browser    "

## 2024-01-16 NOTE — PATIENT INSTRUCTIONS
"As discussed, sinus infections are typically viral and will get better on their own in 7-10 days. You should try symptomatic relief in the meantime with OTC (Claritin or Zyrtec), Afrin up to 3 days then switch to Flonase, and Sudafed(behind the counter) and mucinex. You can also try sinus rinses with a neti pot or nasal lavage (be sure to use distilled water.) Sleep with a cool mist humidifier at your bedside. If your symptoms do not improve after 7-10 days, you may need antibiotics because it is more likely to be bacterial at that point.  Follow-up with your primary care physician for recheck in 2-3 business days. Go to the ER for sudden severe headache, high fevers, change in vision, seizure activity or anything else that is concerning.    [facility] Phone number is [phone] if you need assistance or have further questions  note in \"Letters\" section of [employer] hema. Can print if opened from a web browser    "

## 2024-01-16 NOTE — CARE ANYWHERE EVISITS
Visit Summary for SARAH DURON - Gender: Male - Date of Birth: 1995  Date: [id number] - Duration: 10 minutes  Patient: SARAH ORANTESLP  Provider: Shannon Severino PA-C    Patient Contact Information  Address  [address]  [place] [address]  [phone]    Visit Topics  Cough, congestion, Covid negative  [Added By: Self - 2024-01-16]  Cold [Added By: Self - 2024-01-16]  Headache [Added By: Self - 2024-01-16]    Triage Questions   What is your current physical address in the event of a medical emergency? Answer []  Are you allergic to any medications? Answer []  Are you now or could you be pregnant? Answer []  Do you have any immune system compromise or chronic lung   disease? Answer []  Do you have any vulnerable family members in the home (infant, pregnant, cancer, elderly)? Answer []     Conversation Transcripts  [0A][0A] [Notification] You are connected with Shannon Severino PA-C, Urgent Care Specialist.[0A][Notification] SARAH DURON is located in [place].[0A][Notification] SARAH DURON has shared health history...[0A]    Diagnosis  Acute pansinusitis    Procedures  Value: [id number] Code: CPT-4 UNLISTED E&M SERVICE    Medications Prescribed    No prescriptions ordered    Electronically signed by: Severino PA-C, Shannon(NPI [id number])

## 2024-01-25 ENCOUNTER — TELEPHONE (OUTPATIENT)
Dept: DIABETES SERVICES | Facility: CLINIC | Age: 29
End: 2024-01-25

## 2024-01-25 NOTE — TELEPHONE ENCOUNTER
Pt is scheduled for 1/31 Tandem training with Mireya. I will be emailing you the pump start order. Please complete and return to Mireya by 1/31. Thanks

## 2024-01-31 ENCOUNTER — OFFICE VISIT (OUTPATIENT)
Dept: ENDOCRINOLOGY | Facility: HOSPITAL | Age: 29
End: 2024-01-31
Payer: COMMERCIAL

## 2024-01-31 VITALS
WEIGHT: 267 LBS | BODY MASS INDEX: 33.2 KG/M2 | SYSTOLIC BLOOD PRESSURE: 138 MMHG | HEIGHT: 75 IN | DIASTOLIC BLOOD PRESSURE: 80 MMHG

## 2024-01-31 DIAGNOSIS — E10.10 TYPE 1 DIABETES MELLITUS WITH KETOACIDOSIS WITHOUT COMA (HCC): ICD-10-CM

## 2024-01-31 DIAGNOSIS — I10 PRIMARY HYPERTENSION: ICD-10-CM

## 2024-01-31 DIAGNOSIS — E10.10 DKA, TYPE 1 (HCC): ICD-10-CM

## 2024-01-31 DIAGNOSIS — E10.65 TYPE 1 DIABETES MELLITUS WITH HYPERGLYCEMIA (HCC): Primary | ICD-10-CM

## 2024-01-31 PROCEDURE — 3075F SYST BP GE 130 - 139MM HG: CPT | Performed by: STUDENT IN AN ORGANIZED HEALTH CARE EDUCATION/TRAINING PROGRAM

## 2024-01-31 PROCEDURE — 3079F DIAST BP 80-89 MM HG: CPT | Performed by: STUDENT IN AN ORGANIZED HEALTH CARE EDUCATION/TRAINING PROGRAM

## 2024-01-31 PROCEDURE — 99214 OFFICE O/P EST MOD 30 MIN: CPT | Performed by: STUDENT IN AN ORGANIZED HEALTH CARE EDUCATION/TRAINING PROGRAM

## 2024-01-31 PROCEDURE — 95251 CONT GLUC MNTR ANALYSIS I&R: CPT | Performed by: STUDENT IN AN ORGANIZED HEALTH CARE EDUCATION/TRAINING PROGRAM

## 2024-01-31 RX ORDER — INSULIN DEGLUDEC INJECTION 100 U/ML
95 INJECTION, SOLUTION SUBCUTANEOUS DAILY
Qty: 45 ML | Refills: 1 | Status: SHIPPED | OUTPATIENT
Start: 2024-01-31

## 2024-01-31 RX ORDER — INSULIN LISPRO 100 [IU]/ML
150 INJECTION, SOLUTION INTRAVENOUS; SUBCUTANEOUS CONTINUOUS
Qty: 50 ML | Refills: 1 | Status: SHIPPED | OUTPATIENT
Start: 2024-01-31

## 2024-01-31 NOTE — PROGRESS NOTES
"  Follow up Progress note    Chief Complaint   Patient presents with    Diabetes Type 1     History of Present Illness:   Pepe Lynch is a 29 y.o. male with a history of type 1 diabetes since age 15, Without any complications  with other PMHx of hypertension and class 2 obesity who presents today for diabetes management. Previously managed by endocrine at [facility] 4 years ago but d/t insurance issues couldn't follow up further. Reports was on OTC insulin from [facility] d/t insurance issues and had recent episode of DKA 09/29/23 d/t viral illness who presents today for follow up.     Patient has started pump paperwork and was supposed to start his insulin pump this week but pump hasn't arrived so not on his pump for now. Reports has viral issues and BG have been higher since    Patient was first diagnosed with T1DM- age 15, based on routine blood work  Control since diagnosis: no previous Hx, recent A1C 9.4%  Medications tried thus far: MDI, was on medtronic pump in past, d/c d/t sweating when working in [employer]  Current regime:-   - Tresiba 85u   - Humalog 1:8u bk and dinner and 1:10 for lunch, and 1:20 ISF goal 120    BG control:- Pepe Lynch   Device used Dexcom G7    Indication   Type 1 Diabetes    More than 72 hours of data was reviewed. Report to be scanned to chart.     Date Range:   Analysis of data:  Jan 17- Jan 30 2023  Average Glucose:  239  Time in Target Range: 20%  Time Above Range: 33%/46%  Time Below Range: 0% (<1%)    Interpretation of data: high BG during the day    Hypoglycemic episodes: not often  Hyperglycemia symptoms: no polyuria or polydipsia\",\"no chest pain, dyspnea or TIAs\",\"no numbness, tingling or pain in extremities\"  Diet: has been eating better with 45g per meal and 15g per snack  Activity: stays active    HOSPITALIZATIONS:   Hospitalizations or ED visit for DKA: 09/29/23  Hospitalizations/ED visits since the last office visit: none  Hospitalizations or ED visit for hypoglycemia: " none  Hospitalizations/ED visits since the last office visit: None  Date of last hospitalization/ED visit: 09/29/23    COMPLICATIONS OF DIABETES:   Eye disease in the past: None, hasn't has exam in 2 years  Nephropathy: denies, no labs    Peripheral neuropathy:  None  History of foot sores/infection: None  Gastroparesis: None  Hypoglycemia awareness: Yes  Hypoglycemic seizure: No  Fatty Liver disease: Unknown  Cardiovascular disease: No  Cerebrovascular disease: No  Peripheral Vascular: No  Hx of hypertension: Yes  Last Lipid:- 12/23 LDL 71,   Last urine microalbumin: 12/23 normal  Last hbA1C: 12/23 8.2%, 9.4% 09/23      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, occasional alcohol use, works as a [employer]  Family HX:- mostly T2DM    Patient Active Problem List   Diagnosis    HTN (hypertension)    Tachycardia    BMI 36.0-36.9,adult    Type 1 diabetes mellitus (HCC)      Past Medical History:   Diagnosis Date    Diabetes mellitus (HCC)       Past Surgical History:   Procedure Laterality Date    SINUS SURGERY        Family History   Problem Relation Age of Onset    Diabetes type II Father     Hypertension Father         Triple ByPass    Diabetes type I Maternal Grandmother         Steroid Induced Type 1    Vision loss Maternal Grandmother     Diabetes type II Paternal Grandfather     Hypertension Paternal Grandfather         Quadruple ByPass    Breast cancer Mother     Cancer Maternal Grandfather         Mouth/Throat Cancer    Cancer Paternal Grandmother         Lung Cancer     Social History     Tobacco Use    Smoking status: Never    Smokeless tobacco: Never   Substance Use Topics    Alcohol use: Yes     Alcohol/week: 4.0 standard drinks of alcohol     Types: 4 Cans of beer per week     Comment: Casual drinker -  Light Beer     No Known Allergies      Current Outpatient Medications:     acetone, urine, test (Ketostix) strip, Use 1 strip if needed for high blood sugar, Disp: 25 each, Rfl: 0    Continuous Blood Gluc   "(Dexcom G7 ) CORI, Use 1 each in the morning, Disp: 1 each, Rfl: 0    Continuous Blood Gluc Sensor (Dexcom G7 Sensor), Use 1 Device every 10 days, Disp: 10 each, Rfl: 1    Glucagon 1 MG/0.2ML SOAJ, Inject 1 mg under the skin if needed (hypoglycemic), Disp: 0.2 mL, Rfl: 1    insulin degludec (Tresiba FlexTouch) 100 units/mL injection pen, Inject 70 Units under the skin daily Can titrate upto 85u daily, increase 2u every 3 days that fasting BG >150 Pt states changed to 70, Disp: 45 mL, Rfl: 1    insulin lispro (HumaLOG KwikPen) 100 units/mL injection pen, Inject 10-30 Units under the skin 3 (three) times a day with meals Based on carb ratio 1:8-10-8 and ISF 1:20, Disp: 45 mL, Rfl: 1    lisinopril (ZESTRIL) 10 mg tablet, Take 10 mg by mouth daily, Disp: , Rfl:     ondansetron (ZOFRAN) 4 mg tablet, Take 1 tablet (4 mg total) by mouth every 6 (six) hours, Disp: 12 tablet, Rfl: 0    insulin lispro (HumaLOG) 100 units/mL injection, Inject 150 Units under the skin continuous Dx code 10.10. total dailt dose 150u. To be used for insulin pump (Patient not taking: Reported on 1/31/2024), Disp: 30 mL, Rfl: 1  Review of Systems   Constitutional:  Negative for diaphoresis, fatigue and unexpected weight change.   Respiratory:  Negative for shortness of breath.    Cardiovascular:  Negative for chest pain and palpitations.   Gastrointestinal:  Negative for constipation and diarrhea.   Endocrine: Negative for polydipsia and polyuria.       Physical Exam:  Body mass index is 33.37 kg/m².  /80   Ht 6' 3\" (1.905 m)   Wt 121 kg (267 lb)   BMI 33.37 kg/m²    Wt Readings from Last 3 Encounters:   01/31/24 121 kg (267 lb)   01/09/24 119 kg (263 lb)   12/21/23 119 kg (263 lb)       Physical Exam  Constitutional:       Appearance: Normal appearance. He is obese.   Cardiovascular:      Rate and Rhythm: Normal rate and regular rhythm.      Pulses: Normal pulses.   Pulmonary:      Effort: Pulmonary effort is normal.   Abdominal: "      General: Abdomen is flat.      Palpations: Abdomen is soft.   Skin:     General: Skin is warm.      Capillary Refill: Capillary refill takes less than 2 seconds.   Neurological:      General: No focal deficit present.      Mental Status: He is alert.         Labs:    Latest Reference Range & Units Most Recent   Hemoglobin A1C Normal 4.0-5.6%; PreDiabetic 5.7-6.4%; Diabetic >=6.5%; Glycemic control for adults with diabetes <7.0% % 9.4 (H)  9/30/23 04:51   eAG, EST AVG Glucose mg/dl 223  9/30/23 04:51   POC Glucose 65 - 140 mg/dl 351 (H)  10/9/23 22:56   (H): Data is abnormally high   See labs above  Impression:  1. Type 1 diabetes mellitus with hyperglycemia (HCC)    2. Primary hypertension    3. BMI 36.0-36.9,adult    4. Type 1 diabetes mellitus with ketoacidosis without coma (HCC)           Plan:    Pepe was seen today for diabetes type 1.    Diagnoses and all orders for this visit:    Type 1 diabetes mellitus with hyperglycemia (HCC)    Primary hypertension    BMI 36.0-36.9,adult    Type 1 diabetes mellitus with ketoacidosis without coma (HCC)        1. DKA, type 1 (HCC)    - Ambulatory Referral to Endocrinology  - Hemoglobin A1C; Future  - Comprehensive metabolic panel; Future  - Albumin / creatinine urine ratio; Future  - Lipid panel; Future  - Ambulatory referral to Diabetic Education; Future  - Continuous Blood Gluc  (Dexcom G7 ) CORI; Use 1 each in the morning  Dispense: 1 each; Refill: 0  - Continuous Blood Gluc Sensor (Dexcom G7 Sensor); Use 1 Device every 10 days  Dispense: 10 each; Refill: 1  - insulin degludec (Tresiba FlexTouch) 100 units/mL injection pen; Inject 60 Units under the skin daily Can titrate upto 75u daily, increase 2u every 3 days that fasting BG >150  Dispense: 15 mL; Refill: 1  - insulin lispro (HumaLOG KwikPen) 100 units/mL injection pen; Inject 10 Units under the skin 3 (three) times a day with meals  Dispense: 45 mL; Refill: 1  - acetone, urine, test (Ketostix)  strip; Use 1 strip if needed for high blood sugar  Dispense: 25 each; Refill: 0  - Glucagon 1 MG/0.2ML SOAJ; Inject 1 mg under the skin if needed (hypoglycemic)  Dispense: 0.2 mL; Refill: 1    2. Primary hypertension  3. BMI 36.0-36.9,adult    Patient is a 28y With PMHx of T1DM since 15 without any complications with Hx of DKA with unclear etiology, with Other PMHx of hypertension and class 2 obesity who presents today for diabetic care.     1) T1DM:- patients HbA1C has improved from 9.4% to 8.2% now which is much better. CGM does still show overnight high's possible d/t insulin resistance d/t obesity and viral issues. For now will increase tresiba further to 95u with ability to increae upto 110 if needed. Will also inc ISF from 1:20 to 1:15. No change to CR. Labs in 6 weeks     Plan   - Inc Tresiba 95u   - c/w Humalog 1:8u bk and dinner and 1:10 for lunch, and 1:15 ISF goal 120    Screening:-   Retinopathy- not UTD will perform before next visit  Nephropathy= UTD normal  Lipide levels- UTD- discussed TG elevated at 201, needs to work on low fat diet to improve this. Repeat in 6 weeks      2) Hypertension:- BP in clinic 138/80, cw current regime    3) Hyperlipidemia:- see above    RTC in 8 weeks     Discussed with the patient and all questioned fully answered. He will call me if any problems arise.    Counseled patient on diagnostic results, prognosis, risk and benefit of treatment options, instruction for management, importance of treatment compliance, Risk  factor reduction and impressions        Xenia Dudley MD

## 2024-02-14 ENCOUNTER — OFFICE VISIT (OUTPATIENT)
Dept: DIABETES SERVICES | Facility: HOSPITAL | Age: 29
End: 2024-02-14
Payer: COMMERCIAL

## 2024-02-14 VITALS — HEIGHT: 75 IN | WEIGHT: 267 LBS | BODY MASS INDEX: 33.2 KG/M2

## 2024-02-14 DIAGNOSIS — E10.65 TYPE 1 DIABETES MELLITUS WITH HYPERGLYCEMIA (HCC): Primary | ICD-10-CM

## 2024-02-14 PROCEDURE — TPUMPS: Performed by: DIETITIAN, REGISTERED

## 2024-02-14 NOTE — PROGRESS NOTES
Tandem T Slim Insulin Pump Training    Met with Pepe Lynch for insulin pump training on the T:slim X2 pump. Training completed per training checklist scanned to chart.  Training completed on features, filling the cartridge and tubing, how to take boluses, advanced features, alerts and alarms, infusion set, personal profiles, and all other aspects of training checklist. Asked Pepe Lynch to create a t-connect account at home if they don't have one already so their account can be linked to Endo for downloads and review in between visits- they did this already. Pt knows that their pump has a hardware warranty but new software updates can be done whenever a new one is released. Pepe will be using the Dexcom G7 and Control  technology.  Answered all of their questions. Will call with questions or for more education.    Observations: Met with Pepe and his wife for Tslim training. He did great with the training. Left my office with pump on and running. He did not take his basal insulin today, so turned basal on. Control IQ will be on.  We had issues with his sensor, one failed because it was not compatable, and one failed for unknown reasons. I gave him a replacement, asked him to try again at home after cleaning the sticky stuff off of his arm from the previous sensor. I will check in with him later in the week to see how he is doing.     His basal rates are about half of what he is taking from his 105u Tresiba now, I asked him to call us for a download in 1-2 weeks and call sooner if running very high.     Pepe Lynch was provided with the education materials provided by T:slim.     Lab Results   Component Value Date    HGBA1C 8.2 (H) 12/13/2023       Lab Results   Component Value Date    K 4.7 12/13/2023    CL 99 12/13/2023    CO2 31 12/13/2023    AGAP 7 11/26/2023    BUN 26 (H) 12/13/2023    CREATININE 0.74 12/13/2023    GLUC 191 (H) 12/13/2023    CALCIUM 9.4 12/13/2023    AST 9 (L) 12/13/2023    ALT  11 12/13/2023    ALKPHOS 72 12/13/2023    TP 7.2 12/13/2023    TBILI 0.4 12/13/2023    EGFR 125 12/13/2023       Patient response to instruction    Comprehension: good  Motivation: good  Expected Compliance: good  Response to Teachback: 100%, demonstrated understanding     Thank you for referring your patient to [facility] [place], it was a pleasure working with them today. Please feel free to call with any questions or concerns.    Mireya Han, RD  [address]  [address] [place] [address]

## 2024-02-19 ENCOUNTER — DOCUMENTATION (OUTPATIENT)
Dept: ENDOCRINOLOGY | Facility: HOSPITAL | Age: 29
End: 2024-02-19
Payer: COMMERCIAL

## 2024-02-19 ENCOUNTER — TELEPHONE (OUTPATIENT)
Dept: ENDOCRINOLOGY | Facility: HOSPITAL | Age: 29
End: 2024-02-19

## 2024-02-19 DIAGNOSIS — E10.65 TYPE 1 DIABETES MELLITUS WITH HYPERGLYCEMIA (HCC): Primary | ICD-10-CM

## 2024-02-19 PROCEDURE — 95251 CONT GLUC MNTR ANALYSIS I&R: CPT | Performed by: STUDENT IN AN ORGANIZED HEALTH CARE EDUCATION/TRAINING PROGRAM

## 2024-02-19 NOTE — TELEPHONE ENCOUNTER
The patient called and left a message of the voicemail that he just recently started the pump and he was told to call and have his pump downloaded if he was having issues with highs.    Please review and advise.

## 2024-02-19 NOTE — PROGRESS NOTES
Pepe Lynch   Device used Tandem- dexcom    Indication   Type 1 Diabetes      More than 72 hours of data was reviewed. Report to be scanned to chart.     Date Range: Feb 9- Feb 19 2024    Analysis of data:   Average Glucose: 189  TIR 47%, high BG 34%/17%, Low 1.3% and 0.3%  GMI 7.8%    Interpretation of data:  Patient needing several correction boluses overnight and also still have PPH with each meal. Eating more 3 meals but also eating 3 snacks with some meals <2hrs apart. Is bolusing adequately.     Plan   Basal rates  Mn-3am 2.1  3am-9am-3.3  9am-12pm- 2.5  12pm-2pm- 2.250  2pm-5pm- 2.5  5pm-10pm- 2.3  10pm-MN 2.2    Bolus CR   3AM-9AM- 1:7  5pm-10pm 1:6

## 2024-02-28 ENCOUNTER — TELEPHONE (OUTPATIENT)
Dept: ENDOCRINOLOGY | Facility: HOSPITAL | Age: 29
End: 2024-02-28

## 2024-02-28 ENCOUNTER — DOCUMENTATION (OUTPATIENT)
Dept: ENDOCRINOLOGY | Facility: HOSPITAL | Age: 29
End: 2024-02-28
Payer: COMMERCIAL

## 2024-02-28 DIAGNOSIS — E10.65 TYPE 1 DIABETES MELLITUS WITH HYPERGLYCEMIA (HCC): Primary | ICD-10-CM

## 2024-02-28 PROCEDURE — 95251 CONT GLUC MNTR ANALYSIS I&R: CPT | Performed by: STUDENT IN AN ORGANIZED HEALTH CARE EDUCATION/TRAINING PROGRAM

## 2024-02-28 NOTE — PROGRESS NOTES
Pepe Lynch   Device used Tandem- dexcom     Indication   Type 1 Diabetes        More than 72 hours of data was reviewed. Report to be scanned to chart.      Date Range: Feb 15- Feb 28 2024     Analysis of data:   Average Glucose: 196  TIR 40%, high BG 43%/16%, Low 0.4%  GMI 7.8%     Interpretation of data:  Patient needing several correction boluses overnight and also still have PPH with each meal.      Plan   Basal rates  Mn-3am 2.1  3am-9am-3.3  9am-12pm- 2.5  12pm-2pm- 2.250  2pm-5pm- 2.5  5pm-10pm- 2.3  10pm-MN 2.2     Bolus CR   3AM-9AM-  1:6  9am-5pm:- 1:8  5pm-10pm 1:4

## 2024-02-28 NOTE — TELEPHONE ENCOUNTER
Patient l/m stating he is still having high blood sugars. I put a pump download on your desk for review.

## 2024-03-07 NOTE — PROGRESS NOTES
Sent detailed [facility] message advising. I will keep this open till I know he received the message.

## 2024-03-19 DIAGNOSIS — E10.10 TYPE 1 DIABETES MELLITUS WITH KETOACIDOSIS WITHOUT COMA (HCC): ICD-10-CM

## 2024-03-19 DIAGNOSIS — I10 PRIMARY HYPERTENSION: Primary | ICD-10-CM

## 2024-03-19 DIAGNOSIS — E10.10 DKA, TYPE 1 (HCC): ICD-10-CM

## 2024-03-19 RX ORDER — LISINOPRIL 10 MG/1
10 TABLET ORAL DAILY
Qty: 90 TABLET | Refills: 0 | Status: SHIPPED | OUTPATIENT
Start: 2024-03-19

## 2024-03-19 RX ORDER — INSULIN LISPRO 100 [IU]/ML
150 INJECTION, SOLUTION INTRAVENOUS; SUBCUTANEOUS CONTINUOUS
Qty: 50 ML | Refills: 1 | Status: SHIPPED | OUTPATIENT
Start: 2024-03-19 | End: 2024-03-22 | Stop reason: SDUPTHER

## 2024-03-19 RX ORDER — ACYCLOVIR 400 MG/1
1 TABLET ORAL
Qty: 10 EACH | Refills: 1 | Status: SHIPPED | OUTPATIENT
Start: 2024-03-19 | End: 2024-03-22 | Stop reason: SDUPTHER

## 2024-03-22 DIAGNOSIS — E10.10 DKA, TYPE 1 (HCC): ICD-10-CM

## 2024-03-22 DIAGNOSIS — E10.10 TYPE 1 DIABETES MELLITUS WITH KETOACIDOSIS WITHOUT COMA (HCC): ICD-10-CM

## 2024-03-22 RX ORDER — ACYCLOVIR 400 MG/1
1 TABLET ORAL
Qty: 10 EACH | Refills: 0 | Status: SHIPPED | OUTPATIENT
Start: 2024-03-22

## 2024-03-22 RX ORDER — INSULIN LISPRO 100 [IU]/ML
150 INJECTION, SOLUTION INTRAVENOUS; SUBCUTANEOUS CONTINUOUS
Qty: 50 ML | Refills: 0 | Status: SHIPPED | OUTPATIENT
Start: 2024-03-22 | End: 2024-03-25 | Stop reason: SDUPTHER

## 2024-03-23 ENCOUNTER — TELEPHONE (OUTPATIENT)
Dept: OTHER | Facility: OTHER | Age: 29
End: 2024-03-23

## 2024-03-23 NOTE — TELEPHONE ENCOUNTER
"Pt stated, \"I need my Humalog 100 units/ml injection resent to the [facility] pharmacy for a 90 day supply or my insurance will not cover it.\"     Please call pt when office reopens    [facility]/pharmacy #[id number] - [place] - [address]  [address], [place] [address]  Phone: [phone]  Fax: [phone]    "

## 2024-03-25 DIAGNOSIS — E10.10 TYPE 1 DIABETES MELLITUS WITH KETOACIDOSIS WITHOUT COMA (HCC): Primary | ICD-10-CM

## 2024-03-25 NOTE — TELEPHONE ENCOUNTER
"Pt stated, \"I need my Humalog 100 units/ml injection resent to the [facility] pharmacy for a 90 day supply or my insurance will not cover it.\"      "

## 2024-03-26 RX ORDER — INSULIN LISPRO 100 [IU]/ML
150 INJECTION, SOLUTION INTRAVENOUS; SUBCUTANEOUS CONTINUOUS
Qty: 140 ML | Refills: 1 | Status: SHIPPED | OUTPATIENT
Start: 2024-03-26

## 2024-04-03 LAB
ALBUMIN SERPL-MCNC: 4.6 G/DL (ref 3.6–5.1)
ALBUMIN/GLOB SERPL: 1.6 (CALC) (ref 1–2.5)
ALP SERPL-CCNC: 84 U/L (ref 36–130)
ALT SERPL-CCNC: 13 U/L (ref 9–46)
AST SERPL-CCNC: 13 U/L (ref 10–40)
BILIRUB SERPL-MCNC: 0.4 MG/DL (ref 0.2–1.2)
BUN SERPL-MCNC: 22 MG/DL (ref 7–25)
BUN/CREAT SERPL: NORMAL (CALC) (ref 6–22)
CALCIUM SERPL-MCNC: 9.5 MG/DL (ref 8.6–10.3)
CHLORIDE SERPL-SCNC: 99 MMOL/L (ref 98–110)
CHOLEST SERPL-MCNC: 175 MG/DL
CHOLEST/HDLC SERPL: 4.7 (CALC)
CO2 SERPL-SCNC: 30 MMOL/L (ref 20–32)
CREAT SERPL-MCNC: 0.76 MG/DL (ref 0.6–1.26)
GFR/BSA.PRED SERPLBLD CYS-BASED-ARV: 123 ML/MIN/1.73M2
GLOBULIN SER CALC-MCNC: 2.9 G/DL (CALC) (ref 1.9–3.7)
GLUCOSE SERPL-MCNC: 95 MG/DL (ref 65–99)
HDLC SERPL-MCNC: 37 MG/DL
LDLC SERPL CALC-MCNC: 100 MG/DL (CALC)
NONHDLC SERPL-MCNC: 138 MG/DL (CALC)
POTASSIUM SERPL-SCNC: 4 MMOL/L (ref 3.5–5.3)
PROT SERPL-MCNC: 7.5 G/DL (ref 6.1–8.1)
SODIUM SERPL-SCNC: 138 MMOL/L (ref 135–146)
TRIGL SERPL-MCNC: 269 MG/DL

## 2024-04-09 NOTE — PROGRESS NOTES
"  Follow up Progress note    Chief Complaint   Patient presents with    Diabetes Type 1     History of Present Illness:   Pepe Lynch is a 29 y.o. male with a history of type 1 diabetes since age 15, Without any complications  with other PMHx of hypertension and class 2 obesity who presents today for diabetes management. Previously managed by endocrine at [facility] 4 years ago but d/t insurance issues couldn't follow up further. Reports was on OTC insulin from [facility] d/t insurance issues and had recent episode of DKA 09/29/23 d/t viral illness who presents today for follow up. Last visit 01/24    Patient was first diagnosed with T1DM- age 15, based on routine blood work  Control since diagnosis: no previous Hx, recent A1C 9.4%  Medications tried thus far: MDI, was on medtronic pump in past, d/c d/t sweating when working in [employer]  Current regime:- T slim- control IQ  Plan   Basal rates  Mn-3am 3.630  3am-10am-3.960  10am-3pm- 3.240  3pm-10pm- 3.980  10pm-mn 3.170     Bolus CR   3AM-10AM-  1:6  10am-3pm:- 1:8  3pm-10pm 1:4  10pm-mn 1:10  12am-3am 1:10    ISF 1:15 goal 110     Pepe Lynch   Device used Tandem- dexcom     Indication   Type 1 Diabetes        More than 72 hours of data was reviewed. Report to be scanned to chart.      Date Range: March 28- April 10 2024     Analysis of data:   Average Glucose: 181  TIR 52%, high BG 38%/10%, Low 0.6%  GMI 7.6%     Interpretation of data:  Still having high BG with meals and also during the day and overnight    Hypoglycemic episodes: not often  Hyperglycemia symptoms: no polyuria or polydipsia\",\"no chest pain, dyspnea or TIAs\",\"no numbness, tingling or pain in extremities\"  Diet: reports has cut back on fatty foods and mostly eating lean meats and also vegetables, cut out pasta and eating much more   Activity: reports has had to work a lot more recently and hence hasn't been as active recently.     HOSPITALIZATIONS:   Hospitalizations or ED visit for DKA: " 09/29/23  Hospitalizations/ED visits since the last office visit: none  Hospitalizations or ED visit for hypoglycemia: none  Hospitalizations/ED visits since the last office visit: None  Date of last hospitalization/ED visit: 09/29/23    COMPLICATIONS OF DIABETES:   Eye disease in the past: None, hasn't has exam in 2 years  Nephropathy: denies, no labs    Peripheral neuropathy:  None  History of foot sores/infection: None  Gastroparesis: None  Hypoglycemia awareness: Yes  Hypoglycemic seizure: No  Fatty Liver disease: Unknown  Cardiovascular disease: No  Cerebrovascular disease: No  Peripheral Vascular: No  Hx of hypertension: Yes  Last Lipid:- 04/24 ,   Last urine microalbumin: 12/23 normal, repeat not done  Last hbA1C: 12/23 8.2%, 9.4% 09/23, POC 8.1% today      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, occasional alcohol use, works as a dispature  Family HX:- mostly T2DM    Patient Active Problem List   Diagnosis    HTN (hypertension)    Tachycardia    BMI 36.0-36.9,adult    Type 1 diabetes mellitus (HCC)      Past Medical History:   Diagnosis Date    Diabetes mellitus (HCC)       Past Surgical History:   Procedure Laterality Date    SINUS SURGERY        Family History   Problem Relation Age of Onset    Diabetes type II Father     Hypertension Father         Triple ByPass    Diabetes type I Maternal Grandmother         Steroid Induced Type 1    Vision loss Maternal Grandmother     Diabetes type II Paternal Grandfather     Hypertension Paternal Grandfather         Quadruple ByPass    Breast cancer Mother     Cancer Maternal Grandfather         Mouth/Throat Cancer    Cancer Paternal Grandmother         Lung Cancer     Social History     Tobacco Use    Smoking status: Never    Smokeless tobacco: Never   Substance Use Topics    Alcohol use: Yes     Alcohol/week: 4.0 standard drinks of alcohol     Types: 4 Cans of beer per week     Comment: Casual drinker -  Light Beer     No Known Allergies      Current Outpatient  "Medications:     acetone, urine, test (Ketostix) strip, Use 1 strip if needed for high blood sugar, Disp: 25 each, Rfl: 0    Continuous Blood Gluc  (Dexcom G7 ) CORI, Use 1 each in the morning, Disp: 1 each, Rfl: 0    Continuous Blood Gluc Sensor (Dexcom G7 Sensor), Use 1 Device every 10 days, Disp: 10 each, Rfl: 0    Glucagon 1 MG/0.2ML SOAJ, Inject 1 mg under the skin if needed (hypoglycemic), Disp: 0.2 mL, Rfl: 1    insulin lispro (HumaLOG) 100 units/mL injection, Inject 150 Units under the skin continuous Use up to 150 units daily via insulin pump., Disp: 140 mL, Rfl: 1    lisinopril (ZESTRIL) 10 mg tablet, Take 1 tablet (10 mg total) by mouth daily, Disp: 90 tablet, Rfl: 0    ondansetron (ZOFRAN) 4 mg tablet, Take 1 tablet (4 mg total) by mouth every 6 (six) hours, Disp: 12 tablet, Rfl: 0  Review of Systems   Constitutional:  Negative for diaphoresis, fatigue and unexpected weight change.   Respiratory:  Negative for shortness of breath.    Cardiovascular:  Negative for chest pain and palpitations.   Gastrointestinal:  Negative for constipation and diarrhea.   Endocrine: Negative for polydipsia and polyuria.       Physical Exam:  Body mass index is 36.37 kg/m².  /94   Pulse 64   Ht 6' 3\" (1.905 m)   Wt 132 kg (291 lb)   SpO2 99%   BMI 36.37 kg/m²    Wt Readings from Last 3 Encounters:   04/10/24 132 kg (291 lb)   02/14/24 121 kg (267 lb)   01/31/24 121 kg (267 lb)       Physical Exam  Constitutional:       Appearance: Normal appearance. He is obese.   Cardiovascular:      Rate and Rhythm: Normal rate and regular rhythm.      Pulses: Normal pulses.   Pulmonary:      Effort: Pulmonary effort is normal.   Abdominal:      General: Abdomen is flat.      Palpations: Abdomen is soft.   Skin:     General: Skin is warm.      Capillary Refill: Capillary refill takes less than 2 seconds.   Neurological:      General: No focal deficit present.      Mental Status: He is alert.         Labs:    " Latest Reference Range & Units Most Recent   Hemoglobin A1C Normal 4.0-5.6%; PreDiabetic 5.7-6.4%; Diabetic >=6.5%; Glycemic control for adults with diabetes <7.0% % 9.4 (H)  9/30/23 04:51   eAG, EST AVG Glucose mg/dl 223  9/30/23 04:51   POC Glucose 65 - 140 mg/dl 351 (H)  10/9/23 22:56   (H): Data is abnormally high   See labs above  Impression:  1. Type 1 diabetes mellitus with hyperglycemia (HCC)    2. BMI 36.0-36.9,adult    3. Primary hypertension    4. Type 1 diabetes mellitus with ketoacidosis without coma (HCC)             Plan:    Pepe was seen today for diabetes type 1.    Diagnoses and all orders for this visit:    Type 1 diabetes mellitus with hyperglycemia (HCC)  -     POCT hemoglobin A1c  -     Comprehensive metabolic panel; Future  -     Albumin / creatinine urine ratio; Future  -     Lipid panel; Future  -     Hemoglobin A1C; Future  -     Comprehensive metabolic panel  -     Lipid panel    BMI 36.0-36.9,adult  -     POCT hemoglobin A1c  -     Comprehensive metabolic panel; Future  -     Albumin / creatinine urine ratio; Future  -     Lipid panel; Future  -     Hemoglobin A1C; Future  -     Comprehensive metabolic panel  -     Lipid panel    Primary hypertension  -     POCT hemoglobin A1c  -     Comprehensive metabolic panel; Future  -     Albumin / creatinine urine ratio; Future  -     Lipid panel; Future  -     Hemoglobin A1C; Future  -     Comprehensive metabolic panel  -     Lipid panel    Type 1 diabetes mellitus with ketoacidosis without coma (HCC)  -     POCT hemoglobin A1c  -     Comprehensive metabolic panel; Future  -     Albumin / creatinine urine ratio; Future  -     Lipid panel; Future  -     Hemoglobin A1C; Future  -     Comprehensive metabolic panel  -     Lipid panel          1. DKA, type 1 (HCC)  2. Primary hypertension  3. BMI 36.0-36.9,adult    Patient is a 28y With PMHx of T1DM since 15 without any complications with Hx of DKA with unclear etiology, with Other PMHx of  hypertension and class 2 obesity who presents today for diabetic care.     1) T1DM:- patients HbA1C POC today showed 8.1% which is only slightly better than last time however CGM shows GMI 7.6% which is much better than before. However pump dowload still shows high BG throughout the day and also PPH still mostly with lunch and dinner and hence will increase basal rates by 15% throughout and also adjust his CR with lunch and dinner    Plan   Basal rates  Mn-3am 4.17  3am-10am-4.55  10am-3pm- 3.68  3pm-10pm- 4.55  10pm-mn 3.56     Bolus CR   3AM-10AM-  1:6  10am-3pm:- 1:6  3pm-10pm 1:3  10pm-mn 1:10  12am-3am 1:10    ISF 1:15 goal 110     Screening:-   Retinopathy- not UTD will perform before next visit  Nephropathy= UTD normal  Lipide levels- UTD- discussed TG elevated at 269 now with also elevated TG, needs to work on low fat diet to improve this or else will need to be started on statin by next visit if still not at goal     2) Hypertension:- BP in clinic 142/94, cw current regime    3) Hyperlipidemia:- see above    RTC in 3 months     Discussed with the patient and all questioned fully answered. He will call me if any problems arise.    Counseled patient on diagnostic results, prognosis, risk and benefit of treatment options, instruction for management, importance of treatment compliance, Risk  factor reduction and impressions        Xenia Dudley MD

## 2024-04-10 ENCOUNTER — OFFICE VISIT (OUTPATIENT)
Dept: ENDOCRINOLOGY | Facility: HOSPITAL | Age: 29
End: 2024-04-10
Payer: COMMERCIAL

## 2024-04-10 VITALS
DIASTOLIC BLOOD PRESSURE: 94 MMHG | OXYGEN SATURATION: 99 % | HEIGHT: 75 IN | HEART RATE: 64 BPM | SYSTOLIC BLOOD PRESSURE: 142 MMHG | WEIGHT: 291 LBS | BODY MASS INDEX: 36.18 KG/M2

## 2024-04-10 DIAGNOSIS — E10.10 TYPE 1 DIABETES MELLITUS WITH KETOACIDOSIS WITHOUT COMA (HCC): ICD-10-CM

## 2024-04-10 DIAGNOSIS — I10 PRIMARY HYPERTENSION: ICD-10-CM

## 2024-04-10 DIAGNOSIS — E10.65 TYPE 1 DIABETES MELLITUS WITH HYPERGLYCEMIA (HCC): Primary | ICD-10-CM

## 2024-04-10 LAB — SL AMB POCT HEMOGLOBIN AIC: 8.1 (ref ?–6.5)

## 2024-04-10 PROCEDURE — 83036 HEMOGLOBIN GLYCOSYLATED A1C: CPT | Performed by: STUDENT IN AN ORGANIZED HEALTH CARE EDUCATION/TRAINING PROGRAM

## 2024-04-10 PROCEDURE — 95251 CONT GLUC MNTR ANALYSIS I&R: CPT | Performed by: STUDENT IN AN ORGANIZED HEALTH CARE EDUCATION/TRAINING PROGRAM

## 2024-04-10 PROCEDURE — 99214 OFFICE O/P EST MOD 30 MIN: CPT | Performed by: STUDENT IN AN ORGANIZED HEALTH CARE EDUCATION/TRAINING PROGRAM

## 2024-04-10 NOTE — PATIENT INSTRUCTIONS
Mn-3am 4.17  3am-10am-4.55  10am-3pm- 3.68  3pm-10pm- 4.55  10pm-mn 3.56     Bolus CR   3AM-10AM-  1:6  10am-3pm:- 1:6  3pm-10pm 1:3  10pm-mn 1:10  12am-3am 1:10

## 2024-06-10 DIAGNOSIS — E10.10 DKA, TYPE 1 (HCC): ICD-10-CM

## 2024-06-10 RX ORDER — ACYCLOVIR 400 MG/1
1 TABLET ORAL
Qty: 9 EACH | Refills: 1 | Status: SHIPPED | OUTPATIENT
Start: 2024-06-10

## 2024-07-29 DIAGNOSIS — E10.10 TYPE 1 DIABETES MELLITUS WITH KETOACIDOSIS WITHOUT COMA (HCC): ICD-10-CM

## 2024-07-29 DIAGNOSIS — I10 PRIMARY HYPERTENSION: ICD-10-CM

## 2024-07-30 RX ORDER — LISINOPRIL 10 MG/1
10 TABLET ORAL DAILY
Qty: 30 TABLET | Refills: 0 | Status: SHIPPED | OUTPATIENT
Start: 2024-07-30 | End: 2024-07-31

## 2024-07-30 RX ORDER — INSULIN LISPRO 100 [IU]/ML
150 INJECTION, SOLUTION INTRAVENOUS; SUBCUTANEOUS CONTINUOUS
Qty: 140 ML | Refills: 1 | Status: SHIPPED | OUTPATIENT
Start: 2024-07-30

## 2024-07-31 DIAGNOSIS — I10 PRIMARY HYPERTENSION: ICD-10-CM

## 2024-07-31 RX ORDER — LISINOPRIL 10 MG/1
10 TABLET ORAL DAILY
Qty: 90 TABLET | Refills: 1 | Status: SHIPPED | OUTPATIENT
Start: 2024-07-31

## 2024-07-31 NOTE — TELEPHONE ENCOUNTER
Told Pt prescription at pharmacy.  Then saw message about courtesy refill provided needs appt scheduled.

## 2024-08-01 LAB
ALBUMIN SERPL-MCNC: 4.4 G/DL (ref 3.6–5.1)
ALBUMIN/GLOB SERPL: 1.6 (CALC) (ref 1–2.5)
ALP SERPL-CCNC: 91 U/L (ref 36–130)
ALT SERPL-CCNC: 15 U/L (ref 9–46)
AST SERPL-CCNC: 15 U/L (ref 10–40)
BILIRUB SERPL-MCNC: 0.4 MG/DL (ref 0.2–1.2)
BUN SERPL-MCNC: 23 MG/DL (ref 7–25)
BUN/CREAT SERPL: ABNORMAL (CALC) (ref 6–22)
CALCIUM SERPL-MCNC: 9.3 MG/DL (ref 8.6–10.3)
CHLORIDE SERPL-SCNC: 103 MMOL/L (ref 98–110)
CHOLEST SERPL-MCNC: 145 MG/DL
CHOLEST/HDLC SERPL: 4.4 (CALC)
CO2 SERPL-SCNC: 33 MMOL/L (ref 20–32)
CREAT SERPL-MCNC: 0.71 MG/DL (ref 0.6–1.26)
GFR/BSA.PRED SERPLBLD CYS-BASED-ARV: 126 ML/MIN/1.73M2
GLOBULIN SER CALC-MCNC: 2.8 G/DL (CALC) (ref 1.9–3.7)
GLUCOSE SERPL-MCNC: 137 MG/DL (ref 65–99)
HDLC SERPL-MCNC: 33 MG/DL
LDLC SERPL CALC-MCNC: 85 MG/DL (CALC)
NONHDLC SERPL-MCNC: 112 MG/DL (CALC)
POTASSIUM SERPL-SCNC: 4.5 MMOL/L (ref 3.5–5.3)
PROT SERPL-MCNC: 7.2 G/DL (ref 6.1–8.1)
SODIUM SERPL-SCNC: 139 MMOL/L (ref 135–146)
TRIGL SERPL-MCNC: 170 MG/DL

## 2024-08-07 ENCOUNTER — OFFICE VISIT (OUTPATIENT)
Dept: ENDOCRINOLOGY | Facility: HOSPITAL | Age: 29
End: 2024-08-07
Payer: COMMERCIAL

## 2024-08-07 VITALS
BODY MASS INDEX: 37.55 KG/M2 | DIASTOLIC BLOOD PRESSURE: 84 MMHG | WEIGHT: 302 LBS | HEIGHT: 75 IN | OXYGEN SATURATION: 98 % | HEART RATE: 105 BPM | SYSTOLIC BLOOD PRESSURE: 134 MMHG

## 2024-08-07 DIAGNOSIS — I10 PRIMARY HYPERTENSION: ICD-10-CM

## 2024-08-07 DIAGNOSIS — E10.10 TYPE 1 DIABETES MELLITUS WITH KETOACIDOSIS WITHOUT COMA (HCC): ICD-10-CM

## 2024-08-07 DIAGNOSIS — E10.65 TYPE 1 DIABETES MELLITUS WITH HYPERGLYCEMIA (HCC): Primary | ICD-10-CM

## 2024-08-07 LAB — SL AMB POCT HEMOGLOBIN AIC: 7.3 (ref ?–6.5)

## 2024-08-07 PROCEDURE — 95251 CONT GLUC MNTR ANALYSIS I&R: CPT | Performed by: PHYSICIAN ASSISTANT

## 2024-08-07 PROCEDURE — 99214 OFFICE O/P EST MOD 30 MIN: CPT | Performed by: PHYSICIAN ASSISTANT

## 2024-08-07 PROCEDURE — 83036 HEMOGLOBIN GLYCOSYLATED A1C: CPT | Performed by: PHYSICIAN ASSISTANT

## 2024-08-07 RX ORDER — INSULIN LISPRO 100 [IU]/ML
220 INJECTION, SOLUTION INTRAVENOUS; SUBCUTANEOUS CONTINUOUS
Qty: 200 ML | Refills: 1 | Status: SHIPPED | OUTPATIENT
Start: 2024-08-07

## 2024-08-07 NOTE — PATIENT INSTRUCTIONS
Monitor diet and maintain physical activity.  Make sure to drink plenty water throughout the day.    No adjustments made to insulin pump today.    Switch to Freestyle Goergia to monitor glucose.    Contact the office with any concerns or questions.  Follow-up in 3 months with lab work completed prior to visit.

## 2024-08-07 NOTE — PROGRESS NOTES
Pepe Lynch 29 y.o. male MRN: [id number]    Encounter: [id number]      Assessment & Plan     Assessment:  This is a 29 y.o.-year-old male with type 1 diabetes with hypertension and hyperlipidemia.    Plan:  1.  Type 1 diabetes: Most recent hemoglobin A1c came back at 7.3 which is a significant proven, but is still above goal.  Review of his Dexcom and t:slim and there seems to be issues with hyperglycemia later in the day.  Looking at specific days it looks like these are days that he he is not eating at his normal times.  He is also getting ready for his wedding this weekend.  At this time I do not want to make any adjustments to his insulin pump.  He is interested in switching from the Dexcom to the freestyle shashank 2+ which I am fine with.  I did send a prescription over to the pharmacy.  If there is any issues getting the sensor, please contact the office.  Continue with lifestyle changes to help improve glucose levels.  Contact the office with any concerns or questions.  Follow-up in 3 months with lab work completed prior to visit.    2.  Hypertension: Normotensive in office today.  Kidney function remained stable with normal electrolytes.  Continue with lisinopril 10 mg daily.  Repeat CMP prior to next office visit.    3.  Hypertriglyceridemia: Triglyceride levels have improved, but are still slightly above goal.  As he continues to improve glucose levels, this may overall improved.  No need to start medications at this time.  Will continue to monitor.    CC: Type 1 diabetes follow-up    History of Present Illness     HPI:  Pepe Lynch is a 29 year old male with type 1 diabetes diagnosed around 2010.  He is on insulin at home and takes Humalog via insulin pump. He denies any polyuria, polydipsia, nocturia and blurry vision.  He denies neuropathy, nephropathy, retinopathy, heart attack, stroke, and claudication.  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  Only concern at this  time is that he believes being in control IQ his glucose levels typically run higher and is not receiving enough insulin compared to when he is in manual.  Does admit over the last couple weeks he has been under a lot of stress as he is getting  this weekend.    He is currently utilizing t:slim with control IQ.  Basal: 12 AM 5.74 units/h, 2 AM 6.390 units/h, 7 AM 6.060 units/h, 10 AM 5.30 units/h, 3 PM 5.46 units/h, 9 PM 5.82 units/h  Bolus: 12 AM 1 unit for every 6 g carbohydrates, 2 AM 1 unit for every 6 g carbohydrates, 7 AM 1 unit for every 3 g carbohydrates, 10 AM 1 unit for every 3 g carbohydrates, 3 PM 1 unit for every 3 g carbohydrates, 9 PM 1 unit for every 6 g carbohydrates  Target glucose 110 with a correction factor of 1:15.  Active insulin time is 3 hours.  Currently utilizing 195.14 units daily with approximately 62% being basal and 38% being bolus.    Most recent episode of DKA was September 29, 2023.  This was prior to establishing care in this office.  At that time he was having difficulty getting insulin due to insurance issues.      Hypoglycemic episodes: Yes, rare episodes.  Does keep fast acting carbohydrates on hand.    The patient's last eye exam was in December 2023.  The patient's last foot exam was in November 2023.    Blood Sugar/Glucometer/Pump/CGM review: Download of Dexcom and t:slim from July 24 through August 6, 2024 reveals an average glucose level of 186 with a standard deviation of 57.  He is in target range 50% time, above target range 49% of the time, below target range 1% time.  Glucose levels typically trend down overnight.  Usually lowest around lunch, and then slowly increase throughout the day.  Does have higher blood sugars prior to bed.  Review of individual days it looks like the days that his eating schedule is off typically lead to higher glucose levels later in the day.    For hypertension he is currently on lisinopril 10 mg daily.  For hypertriglyceridemia he  is currently not on any medications and is working on lifestyle changes to help improve levels.     Review of Systems   Constitutional:  Negative for activity change, appetite change, fatigue and unexpected weight change.   HENT:  Negative for trouble swallowing.    Eyes:  Negative for visual disturbance.   Respiratory:  Negative for chest tightness and shortness of breath.    Cardiovascular:  Negative for chest pain, palpitations and leg swelling.   Gastrointestinal:  Negative for abdominal pain, diarrhea, nausea and vomiting.   Endocrine: Negative for cold intolerance, heat intolerance, polydipsia, polyphagia and polyuria.   Genitourinary:  Negative for frequency.   Skin:  Negative for rash and wound.   Neurological:  Negative for dizziness, weakness, light-headedness, numbness and headaches.   Psychiatric/Behavioral:  Negative for dysphoric mood and sleep disturbance. The patient is not nervous/anxious.        Historical Information   Past Medical History:   Diagnosis Date   • Diabetes mellitus (HCC)      Past Surgical History:   Procedure Laterality Date   • SINUS SURGERY       Social History   Social History     Substance and Sexual Activity   Alcohol Use Yes   • Alcohol/week: 4.0 standard drinks of alcohol   • Types: 4 Cans of beer per week    Comment: Casual drinker -  Light Beer     Social History     Substance and Sexual Activity   Drug Use Never     Social History     Tobacco Use   Smoking Status Never   Smokeless Tobacco Never     Family History:   Family History   Problem Relation Age of Onset   • Diabetes type II Father    • Hypertension Father         Triple ByPass   • Diabetes type I Maternal Grandmother         Steroid Induced Type 1   • Vision loss Maternal Grandmother    • Diabetes type II Paternal Grandfather    • Hypertension Paternal Grandfather         Quadruple ByPass   • Breast cancer Mother    • Cancer Maternal Grandfather         Mouth/Throat Cancer   • Cancer Paternal Grandmother          "Lung Cancer       Meds/Allergies   Current Outpatient Medications   Medication Sig Dispense Refill   • acetone, urine, test (Ketostix) strip Use 1 strip if needed for high blood sugar 25 each 0   • Continuous Blood Gluc  (Dexcom G7 ) CORI Use 1 each in the morning 1 each 0   • Continuous Glucose Sensor (Dexcom G7 Sensor) USE 1 DEVICE EVERY 10 DAYS 9 each 1   • Continuous Glucose Sensor (FreeStyle Georgia 2 Sensor) MISC Use 1 patch every 14 (fourteen) days 6 each 3   • Glucagon 1 MG/0.2ML SOAJ Inject 1 mg under the skin if needed (hypoglycemic) 0.2 mL 1   • lisinopril (ZESTRIL) 10 mg tablet TAKE 1 TABLET BY MOUTH EVERY DAY 90 tablet 1   • insulin lispro (HumaLOG) 100 units/mL injection Inject 220 Units under the skin continuous Via insulin pump 200 mL 1     No current facility-administered medications for this visit.     No Known Allergies    Objective   Vitals: Blood pressure 134/84, pulse 105, height 6' 3\" (1.905 m), weight (!) 137 kg (302 lb), SpO2 98%.    Physical Exam  Vitals and nursing note reviewed.   Constitutional:       General: He is not in acute distress.     Appearance: Normal appearance. He is not diaphoretic.   HENT:      Head: Normocephalic and atraumatic.   Eyes:      General: No scleral icterus.     Extraocular Movements: Extraocular movements intact.      Conjunctiva/sclera: Conjunctivae normal.      Pupils: Pupils are equal, round, and reactive to light.   Cardiovascular:      Rate and Rhythm: Normal rate and regular rhythm.      Heart sounds: No murmur heard.  Pulmonary:      Effort: Pulmonary effort is normal. No respiratory distress.      Breath sounds: Normal breath sounds. No wheezing.   Musculoskeletal:      Right lower leg: No edema.      Left lower leg: No edema.   Lymphadenopathy:      Cervical: No cervical adenopathy.   Skin:     General: Skin is warm and dry.   Neurological:      Mental Status: He is alert and oriented to person, place, and time.      Sensory: No sensory " deficit.   Psychiatric:         Mood and Affect: Mood normal.         Behavior: Behavior normal.         Thought Content: Thought content normal.         The history was obtained from the review of the chart, patient.    Lab Results:   Lab Results   Component Value Date/Time    Hemoglobin A1C 7.3 (A) 08/07/2024 08:37 AM    Hemoglobin A1C 8.1 (A) 04/10/2024 08:50 AM    Hemoglobin A1C 8.2 (H) 12/13/2023 08:28 AM    Hemoglobin A1C 9.4 (H) 09/30/2023 04:51 AM    WBC 9.17 11/26/2023 12:00 PM    WBC 10.57 (H) 10/09/2023 11:13 PM    WBC 12.90 (H) 09/30/2023 04:51 AM    Hemoglobin 15.7 11/26/2023 12:00 PM    Hemoglobin 15.9 10/09/2023 11:13 PM    Hemoglobin 13.6 09/30/2023 04:51 AM    Hgb, i-STAT 15.6 11/26/2023 12:06 PM    Hematocrit 46.7 11/26/2023 12:00 PM    Hematocrit 47.0 10/09/2023 11:13 PM    Hematocrit 40.3 09/30/2023 04:51 AM    Hct, i-STAT 46 11/26/2023 12:06 PM    MCV 83 11/26/2023 12:00 PM    MCV 83 10/09/2023 11:13 PM    MCV 83 09/30/2023 04:51 AM    Platelets 249 11/26/2023 12:00 PM    Platelets 289 10/09/2023 11:13 PM    Platelets 229 09/30/2023 04:51 AM    BUN 23 07/31/2024 09:52 AM    BUN 22 04/03/2024 08:57 AM    BUN 26 (H) 12/13/2023 08:28 AM    Potassium 4.5 07/31/2024 09:52 AM    Potassium 4.0 04/03/2024 08:57 AM    Potassium 4.7 12/13/2023 08:28 AM    Chloride 103 07/31/2024 09:52 AM    Chloride 99 04/03/2024 08:57 AM    Chloride 99 12/13/2023 08:28 AM    CO2 33 (H) 07/31/2024 09:52 AM    CO2 30 04/03/2024 08:57 AM    CO2 31 12/13/2023 08:28 AM    Creatinine 0.71 07/31/2024 09:52 AM    Creatinine 0.76 04/03/2024 08:57 AM    Creatinine 0.74 12/13/2023 08:28 AM    Creatinine 0.83 11/26/2023 12:18 PM    Creatinine 0.83 11/26/2023 12:00 PM    Creatinine 0.94 10/09/2023 11:13 PM    AST 15 07/31/2024 09:52 AM    AST 13 04/03/2024 08:57 AM    AST 9 (L) 12/13/2023 08:28 AM    ALT 15 07/31/2024 09:52 AM    ALT 13 04/03/2024 08:57 AM    ALT 11 12/13/2023 08:28 AM    Protein, Total 7.2 07/31/2024 09:52 AM     "Protein, Total 7.5 04/03/2024 08:57 AM    Protein, Total 7.2 12/13/2023 08:28 AM    Albumin 4.4 07/31/2024 09:52 AM    Albumin 4.6 04/03/2024 08:57 AM    Albumin 4.4 12/13/2023 08:28 AM    Albumin 4.8 11/26/2023 12:00 PM    Albumin 4.9 10/09/2023 11:13 PM    Albumin 5.4 (H) 09/29/2023 03:01 PM    Globulin 2.8 07/31/2024 09:52 AM    Globulin 2.9 04/03/2024 08:57 AM    Globulin 2.8 12/13/2023 08:28 AM    HDL 33 (L) 07/31/2024 09:52 AM    HDL 37 (L) 04/03/2024 08:57 AM    HDL 32 (L) 12/13/2023 08:28 AM    Triglycerides 170 (H) 07/31/2024 09:52 AM    Triglycerides 269 (H) 04/03/2024 08:57 AM    Triglycerides 201 (H) 12/13/2023 08:28 AM         Portions of the record may have been created with voice recognition software. Occasional wrong word or \"sound a like\" substitutions may have occurred due to the inherent limitations of voice recognition software. Read the chart carefully and recognize, using context, where substitutions have occurred.    "

## 2024-09-01 DIAGNOSIS — E10.10 TYPE 1 DIABETES MELLITUS WITH KETOACIDOSIS WITHOUT COMA (HCC): ICD-10-CM

## 2024-09-02 DIAGNOSIS — E10.65 TYPE 1 DIABETES MELLITUS WITH HYPERGLYCEMIA (HCC): ICD-10-CM

## 2024-09-02 RX ORDER — INSULIN LISPRO 100 [IU]/ML
220 INJECTION, SOLUTION INTRAVENOUS; SUBCUTANEOUS CONTINUOUS
Qty: 200 ML | Refills: 0 | Status: SHIPPED | OUTPATIENT
Start: 2024-09-02

## 2024-09-05 ENCOUNTER — TELEPHONE (OUTPATIENT)
Dept: ENDOCRINOLOGY | Facility: HOSPITAL | Age: 29
End: 2024-09-05

## 2024-09-05 NOTE — TELEPHONE ENCOUNTER
[facility] sent a medication clarification form for the patient insulin.  Provider reviewed and signed and it was faxed back to [phone]  
Yes

## 2024-09-17 DIAGNOSIS — E10.10 TYPE 1 DIABETES MELLITUS WITH KETOACIDOSIS WITHOUT COMA (HCC): ICD-10-CM

## 2024-09-17 RX ORDER — INSULIN LISPRO 100 [IU]/ML
INJECTION, SOLUTION INTRAVENOUS; SUBCUTANEOUS
Qty: 70 ML | Refills: 2 | Status: SHIPPED | OUTPATIENT
Start: 2024-09-17

## 2024-09-19 ENCOUNTER — TELEPHONE (OUTPATIENT)
Age: 29
End: 2024-09-19

## 2024-09-19 DIAGNOSIS — E10.10 DKA, TYPE 1 (HCC): ICD-10-CM

## 2024-09-19 RX ORDER — ACYCLOVIR 400 MG/1
1 TABLET ORAL
Qty: 9 EACH | Refills: 1 | Status: SHIPPED | OUTPATIENT
Start: 2024-09-19

## 2024-09-19 RX ORDER — URINE ACETONE TEST STRIPS
1 STRIP MISCELLANEOUS AS NEEDED
Qty: 50 EACH | Refills: 2 | Status: SHIPPED | OUTPATIENT
Start: 2024-09-19

## 2024-09-19 NOTE — TELEPHONE ENCOUNTER
Medication refill    Dexcom G7 sensor    Please send to [facility] in [place].

## 2024-09-19 NOTE — TELEPHONE ENCOUNTER
Patient calling, he states [facility] does not have the Georgia 2 plus they only have the Georgia 2, which is not compatible with his tandem pump. He called some other pharmacies and none have the Libre2 plus. He is requesting to switch back to Dexcom G7.  Refill request to be sent to [facility] on file.  Just an FYI

## 2024-09-24 ENCOUNTER — TELEPHONE (OUTPATIENT)
Dept: FAMILY MEDICINE CLINIC | Facility: HOSPITAL | Age: 29
End: 2024-09-24

## 2024-10-03 ENCOUNTER — TELEPHONE (OUTPATIENT)
Age: 29
End: 2024-10-03

## 2024-10-03 DIAGNOSIS — E10.10 TYPE 1 DIABETES MELLITUS WITH KETOACIDOSIS WITHOUT COMA (HCC): ICD-10-CM

## 2024-10-03 NOTE — TELEPHONE ENCOUNTER
Left message to call back. Does generic insulin lispro or brand humalog need to be order for a 90 day supply?

## 2024-10-03 NOTE — TELEPHONE ENCOUNTER
Patient calling back, he states per his insurance it must be sent as generic insulin lispro and for a 90 day supply.  Please send to the [facility] in [place].  Please advise

## 2024-10-03 NOTE — TELEPHONE ENCOUNTER
Patient called- he received a call from [facility] again. They cannot fill his Humalog because it was not sent as the generic, Insulin Lispro. They need this sent as a 90 day supply in order to cover it.    Rather than going through [facility], he would just like it sent to [facility] in [place].

## 2024-10-04 DIAGNOSIS — E10.10 TYPE 1 DIABETES MELLITUS WITH KETOACIDOSIS WITHOUT COMA (HCC): Primary | ICD-10-CM

## 2024-10-04 RX ORDER — INSULIN LISPRO 100 [IU]/ML
INJECTION, SOLUTION INTRAVENOUS; SUBCUTANEOUS
Qty: 210 ML | Refills: 2 | OUTPATIENT
Start: 2024-10-04

## 2024-10-08 ENCOUNTER — TELEPHONE (OUTPATIENT)
Dept: ENDOCRINOLOGY | Facility: HOSPITAL | Age: 29
End: 2024-10-08

## 2024-10-09 NOTE — TELEPHONE ENCOUNTER
Key was for     PA for Dexcom G7  SUBMITTED     via    [x]CMM-KEY: [id number]  []Surescripts-Case ID #   []Availity-Auth ID # NDC #   []Faxed to plan   []Other website   []Phone call Case ID #     Office notes sent, clinical questions answered. Awaiting determination    Turnaround time for your insurance to make a decision on your Prior Authorization can take 7-21 business days.

## 2024-10-09 NOTE — TELEPHONE ENCOUNTER
[place] [facility] calling asks if  was defective or out of warranty.  Call back number [phone].

## 2024-10-10 NOTE — TELEPHONE ENCOUNTER
PA for Dexcom G7 Sensor SUBMITTED     via    [x]CMM-KEY: [id number]  []Surescripts-Case ID #   []Availity-Auth ID # NDC #   []Faxed to plan   []Other website   []Phone call Case ID #     Office notes sent, clinical questions answered. Awaiting determination    Turnaround time for your insurance to make a decision on your Prior Authorization can take 7-21 business days.

## 2024-10-11 NOTE — TELEPHONE ENCOUNTER
PA for Continuous Glucose Sensor (Dexcom G7 Sensor)  CANCELLED due to     []Approval on file-dates approved    [x]Medication already on Formulary  []Brand Name Preferred  []Patient no longer covered by insurance    Patient advised by     []My Chart Message  []Phone call    Message sent to office clinical pool   No      Scanned into Media  yes

## 2024-10-31 LAB
ALBUMIN SERPL-MCNC: 4.6 G/DL (ref 3.6–5.1)
ALBUMIN/GLOB SERPL: 1.3 (CALC) (ref 1–2.5)
ALP SERPL-CCNC: 100 U/L (ref 36–130)
ALT SERPL-CCNC: 17 U/L (ref 9–46)
AST SERPL-CCNC: 12 U/L (ref 10–40)
BILIRUB SERPL-MCNC: 0.3 MG/DL (ref 0.2–1.2)
BUN SERPL-MCNC: 20 MG/DL (ref 7–25)
BUN/CREAT SERPL: ABNORMAL (CALC) (ref 6–22)
CALCIUM SERPL-MCNC: 9.8 MG/DL (ref 8.6–10.3)
CHLORIDE SERPL-SCNC: 100 MMOL/L (ref 98–110)
CO2 SERPL-SCNC: 31 MMOL/L (ref 20–32)
CREAT SERPL-MCNC: 0.81 MG/DL (ref 0.6–1.26)
GFR/BSA.PRED SERPLBLD CYS-BASED-ARV: 121 ML/MIN/1.73M2
GLOBULIN SER CALC-MCNC: 3.6 G/DL (CALC) (ref 1.9–3.7)
GLUCOSE SERPL-MCNC: 61 MG/DL (ref 65–99)
POTASSIUM SERPL-SCNC: 3.9 MMOL/L (ref 3.5–5.3)
PROT SERPL-MCNC: 8.2 G/DL (ref 6.1–8.1)
SODIUM SERPL-SCNC: 140 MMOL/L (ref 135–146)
TSH SERPL-ACNC: 3.03 MIU/L (ref 0.4–4.5)

## 2024-11-12 ENCOUNTER — OFFICE VISIT (OUTPATIENT)
Dept: FAMILY MEDICINE CLINIC | Facility: HOSPITAL | Age: 29
End: 2024-11-12
Payer: COMMERCIAL

## 2024-11-12 VITALS
WEIGHT: 309 LBS | HEART RATE: 124 BPM | TEMPERATURE: 98.6 F | HEIGHT: 75 IN | DIASTOLIC BLOOD PRESSURE: 100 MMHG | OXYGEN SATURATION: 98 % | SYSTOLIC BLOOD PRESSURE: 150 MMHG | BODY MASS INDEX: 38.42 KG/M2

## 2024-11-12 DIAGNOSIS — I10 PRIMARY HYPERTENSION: ICD-10-CM

## 2024-11-12 DIAGNOSIS — J06.9 UPPER RESPIRATORY TRACT INFECTION, UNSPECIFIED TYPE: ICD-10-CM

## 2024-11-12 DIAGNOSIS — H66.001 NON-RECURRENT ACUTE SUPPURATIVE OTITIS MEDIA OF RIGHT EAR WITHOUT SPONTANEOUS RUPTURE OF TYMPANIC MEMBRANE: Primary | ICD-10-CM

## 2024-11-12 PROCEDURE — 99214 OFFICE O/P EST MOD 30 MIN: CPT

## 2024-11-12 RX ORDER — LISINOPRIL 20 MG/1
20 TABLET ORAL DAILY
Qty: 30 TABLET | Refills: 0 | Status: SHIPPED | OUTPATIENT
Start: 2024-11-12

## 2024-11-12 RX ORDER — FLUTICASONE PROPIONATE 50 MCG
2 SPRAY, SUSPENSION (ML) NASAL DAILY
Qty: 15.8 ML | Refills: 1 | Status: SHIPPED | OUTPATIENT
Start: 2024-11-12

## 2024-11-12 NOTE — PROGRESS NOTES
Ambulatory Visit  Name: Pepe Lynch      : 1995      MRN: [id number]  Encounter Provider: Layton Strong MD  Encounter Date: 2024   Encounter department: [facility] [address]    Assessment & Plan  Non-recurrent acute suppurative otitis media of right ear without spontaneous rupture of tympanic membrane    Orders:    amoxicillin-clavulanate (AUGMENTIN) 875-125 mg per tablet; Take 1 tablet by mouth every 12 (twelve) hours for 7 days    Upper respiratory tract infection, unspecified type    Orders:    fluticasone (FLONASE) 50 mcg/act nasal spray; 2 sprays into each nostril daily    Primary hypertension  150/100, reports high Bps at home, increasing lisinopril from 10 to 20 mg daily.  Counseled on lifestyle changes including weight loss and diet, hypertension handout reviewed and provided.  Advised to bring blood pressure home logs to follow-up appointment  Orders:    lisinopril (ZESTRIL) 20 mg tablet; Take 1 tablet (20 mg total) by mouth daily       History of Present Illness     Earache   There is pain in the right ear. This is a new problem. The current episode started yesterday. The problem occurs constantly. The problem has been gradually worsening. There has been no fever. The fever has been present for Less than 1 day. The pain is at a severity of 4/10. Associated symptoms include coughing, headaches, rhinorrhea and a sore throat. Pertinent negatives include no abdominal pain, diarrhea, ear discharge, hearing loss, neck pain, rash or vomiting.     Congestion started 4 days ago    Review of Systems   HENT:  Positive for ear pain, rhinorrhea and sore throat. Negative for ear discharge and hearing loss.    Respiratory:  Positive for cough.    Gastrointestinal:  Negative for abdominal pain, diarrhea and vomiting.   Musculoskeletal:  Negative for neck pain.   Skin:  Negative for rash.   Neurological:  Positive for headaches.           Objective     /100   Pulse  "(!) 124   Temp 98.6 °F (37 °C)   Ht 6' 3\" (1.905 m)   Wt (!) 140 kg (309 lb)   SpO2 98%   BMI 38.62 kg/m²     Physical Exam  Constitutional:       General: He is not in acute distress.     Appearance: He is obese. He is not ill-appearing, toxic-appearing or diaphoretic.   HENT:      Right Ear: Ear canal and external ear normal. There is no impacted cerumen.      Left Ear: Ear canal and external ear normal. There is no impacted cerumen.      Ears:      Comments: Bulging erythematous TMs, right more than left  Cardiovascular:      Rate and Rhythm: Normal rate and regular rhythm.      Heart sounds: Normal heart sounds. No murmur heard.  Pulmonary:      Effort: Pulmonary effort is normal. No respiratory distress.      Breath sounds: Normal breath sounds.   Neurological:      Mental Status: He is oriented to person, place, and time.   Psychiatric:         Behavior: Behavior normal.         "

## 2024-11-12 NOTE — ASSESSMENT & PLAN NOTE
150/100, reports high Bps at home, increasing lisinopril from 10 to 20 mg daily.  Counseled on lifestyle changes including weight loss and diet, hypertension handout reviewed and provided.  Advised to bring blood pressure home logs to follow-up appointment  Orders:    lisinopril (ZESTRIL) 20 mg tablet; Take 1 tablet (20 mg total) by mouth daily

## 2025-02-11 ENCOUNTER — TELEPHONE (OUTPATIENT)
Dept: ENDOCRINOLOGY | Facility: HOSPITAL | Age: 30
End: 2025-02-11

## 2025-02-11 ENCOUNTER — OFFICE VISIT (OUTPATIENT)
Dept: ENDOCRINOLOGY | Facility: HOSPITAL | Age: 30
End: 2025-02-11
Payer: COMMERCIAL

## 2025-02-11 VITALS
DIASTOLIC BLOOD PRESSURE: 98 MMHG | HEIGHT: 75 IN | WEIGHT: 315 LBS | BODY MASS INDEX: 39.17 KG/M2 | HEART RATE: 107 BPM | SYSTOLIC BLOOD PRESSURE: 160 MMHG

## 2025-02-11 DIAGNOSIS — E10.10 TYPE 1 DIABETES MELLITUS WITH KETOACIDOSIS WITHOUT COMA (HCC): ICD-10-CM

## 2025-02-11 DIAGNOSIS — E10.65 TYPE 1 DIABETES MELLITUS WITH HYPERGLYCEMIA (HCC): Primary | ICD-10-CM

## 2025-02-11 DIAGNOSIS — E10.65 TYPE 1 DIABETES MELLITUS WITH HYPERGLYCEMIA (HCC): ICD-10-CM

## 2025-02-11 DIAGNOSIS — E88.819 INSULIN RESISTANCE: ICD-10-CM

## 2025-02-11 LAB — SL AMB POCT HEMOGLOBIN AIC: 8.1 (ref ?–6.5)

## 2025-02-11 PROCEDURE — 83036 HEMOGLOBIN GLYCOSYLATED A1C: CPT | Performed by: PHYSICIAN ASSISTANT

## 2025-02-11 PROCEDURE — 99214 OFFICE O/P EST MOD 30 MIN: CPT | Performed by: PHYSICIAN ASSISTANT

## 2025-02-11 PROCEDURE — 95251 CONT GLUC MNTR ANALYSIS I&R: CPT | Performed by: PHYSICIAN ASSISTANT

## 2025-02-11 RX ORDER — INSULIN LISPRO 100 [IU]/ML
INJECTION, SOLUTION INTRAVENOUS; SUBCUTANEOUS
Qty: 300 ML | Refills: 2 | OUTPATIENT
Start: 2025-02-11

## 2025-02-11 RX ORDER — METFORMIN HYDROCHLORIDE 500 MG/1
500 TABLET, EXTENDED RELEASE ORAL 2 TIMES DAILY WITH MEALS
Qty: 180 TABLET | Refills: 3 | Status: SHIPPED | OUTPATIENT
Start: 2025-02-11 | End: 2025-02-14 | Stop reason: SDUPTHER

## 2025-02-11 NOTE — PROGRESS NOTES
Pepe Lynch 30 y.o. male MRN: [id number]    Encounter: [id number]      Assessment & Plan     Assessment:  This is a 30 y.o.-year-old male with type 1 diabetes with hypertension and hyperlipidemia.    Plan:  1.  Type 1 diabetes: Most recent hemoglobin A1c completed in office today was 8.1.  Review of his Dexcom shows that glucose levels start increasing mid afternoon and then remain high for several hours before trending down overnight.  At this time I recommend changing insulin to carb ratio at 7 AM to 5, and at 10 AM and 3 PM to 1.  Due to his weight and concerns of insulin resistance I would also like to start him on metformin 500 mg twice a day.  Continue utilizing t:slim insulin pump and Dexcom.  If there is any issues, please contact the office.  At this time he will follow-up in 3 months with labwork completed prior to visit.     2.  Hypertension: Normotensive in office today.  Kidney function remained stable with normal electrolytes.  Continue with lisinopril 10 mg daily.  Repeat CMP prior to next office visit.     3.  Hypertriglyceridemia: Triglyceride levels have improved, but are still slightly above goal.  As he continues to improve glucose levels, this may overall improved.  No need to start medications at this time.  Repeat lipid panel prior to next office visit    CC: Type 1 diabetes follow-up    History of Present Illness     HPI:  Pepe Lynch is a 29 year old male with type 1 diabetes diagnosed around 2010.  He is on insulin at home and takes Humalog via insulin pump. He denies any polyuria, polydipsia, nocturia and blurry vision.  He denies neuropathy, nephropathy, retinopathy, heart attack, stroke, and claudication.  Doing well today, but has concerns with his glucose levels by mid afternoon.  States that they start increasing by 3 PM, and will remain high till he goes to bed.  Has had quite a few life changes in the last few months.  Recently got  and got a promotion at work  which she is now working longer hours.  Otherwise he is doing well today.     He is currently utilizing t:slim with control IQ.  Basal: 12 AM 6.601 units/h, 2 AM 7.340 units/h, 7 AM 6.970 units/h, 10 AM 6.100 units/h, 3 PM 6.280 units/h, 9 PM 6.690 units/h  Bolus: 12 AM 1 unit for every 10 g carbohydrates, 2 AM 1 unit for every 10 g carbohydrates, 7 AM 1 unit for every 6 g carbohydrates, 10 AM 1 unit for every 3 g carbohydrates, 3 PM 1 unit for every 3 g carbohydrates, 9 PM 1 unit for every 6 g carbohydrates  Target glucose 110 with a correction factor of 1:10.  Active insulin time is 3 hours.  Currently utilizing 302.16 units daily with approximately 61% being basal and 39% being bolus.     Most recent episode of DKA was September 29, 2023.  This was prior to establishing care in this office.  At that time he was having difficulty getting insulin due to insurance issues.        Hypoglycemic episodes: Yes, rare episodes.  Does keep fast acting carbohydrates on hand.     The patient's last eye exam was in December 2023.  The patient's last foot exam was in November 2023.     Blood Sugar/Glucometer/Pump/CGM review: Downloaded Dexcom from January 29 through February 11, 2025 reveals an average glucose level of 200 with 80 standard deviation of 75.  He is in target range 44% time, above target range approximately 55% time, below target range approximately 1% time.  Glucose levels peak around 9 PM, and start trending down overnight.  Lowest levels around 7 in the morning and on average remain relatively stable up until about 1 PM where they continue to trend up for the rest of the day.     For hypertension he is currently on lisinopril 10 mg daily.  For hypertriglyceridemia he is currently not on any medications and is working on lifestyle changes to help improve levels.     Review of Systems   Constitutional:  Negative for activity change, appetite change, fatigue and unexpected weight change.   HENT:  Negative for  trouble swallowing.    Eyes:  Negative for visual disturbance.   Respiratory:  Negative for chest tightness and shortness of breath.    Cardiovascular:  Negative for chest pain, palpitations and leg swelling.   Gastrointestinal:  Negative for abdominal pain, diarrhea, nausea and vomiting.   Endocrine: Negative for cold intolerance, heat intolerance, polydipsia, polyphagia and polyuria.   Genitourinary:  Negative for frequency.   Skin:  Negative for rash and wound.   Neurological:  Negative for dizziness, weakness, light-headedness, numbness and headaches.   Psychiatric/Behavioral:  Negative for dysphoric mood and sleep disturbance. The patient is not nervous/anxious.        Historical Information   Past Medical History:   Diagnosis Date   • Diabetes mellitus (HCC)      Past Surgical History:   Procedure Laterality Date   • SINUS SURGERY       Social History   Social History     Substance and Sexual Activity   Alcohol Use Yes   • Alcohol/week: 4.0 standard drinks of alcohol   • Types: 4 Cans of beer per week    Comment: Casual drinker -  Light Beer     Social History     Substance and Sexual Activity   Drug Use Never     Social History     Tobacco Use   Smoking Status Never   Smokeless Tobacco Never     Family History:   Family History   Problem Relation Age of Onset   • Diabetes type II Father    • Hypertension Father         Triple ByPass   • Diabetes type I Maternal Grandmother         Steroid Induced Type 1   • Vision loss Maternal Grandmother    • Diabetes type II Paternal Grandfather    • Hypertension Paternal Grandfather         Quadruple ByPass   • Breast cancer Mother    • Cancer Maternal Grandfather         Mouth/Throat Cancer   • Cancer Paternal Grandmother         Lung Cancer       Meds/Allergies   Current Outpatient Medications   Medication Sig Dispense Refill   • acetone, urine, test (Ketostix) strip Use 1 strip if needed for high blood sugar 50 each 2   • Continuous Blood Gluc  (Dexcom G7  ") CORI Use 1 each in the morning 1 each 0   • Continuous Glucose Sensor (Dexcom G7 Sensor) Use 1 Device every 10 days 9 each 1   • Glucagon 1 MG/0.2ML SOAJ Inject 1 mg under the skin if needed (hypoglycemic) 0.2 mL 1   • insulin lispro (humALOG/ADMELOG) 100 units/mL Utilized 350 units via insulin pump daily. 300 mL 2   • lisinopril (ZESTRIL) 20 mg tablet Take 1 tablet (20 mg total) by mouth daily 30 tablet 0   • metFORMIN (GLUCOPHAGE-XR) 500 mg 24 hr tablet Take 1 tablet (500 mg total) by mouth 2 (two) times a day with meals 180 tablet 3     No current facility-administered medications for this visit.     No Known Allergies    Objective   Vitals: Blood pressure 160/98, pulse (!) 107, height 6' 3\" (1.905 m), weight (!) 146 kg (320 lb 12.8 oz).    Physical Exam  Vitals and nursing note reviewed.   Constitutional:       General: He is not in acute distress.     Appearance: Normal appearance. He is not diaphoretic.   HENT:      Head: Normocephalic and atraumatic.   Eyes:      General: No scleral icterus.     Extraocular Movements: Extraocular movements intact.      Conjunctiva/sclera: Conjunctivae normal.      Pupils: Pupils are equal, round, and reactive to light.   Cardiovascular:      Rate and Rhythm: Normal rate and regular rhythm.      Heart sounds: No murmur heard.  Pulmonary:      Effort: Pulmonary effort is normal. No respiratory distress.      Breath sounds: Normal breath sounds. No wheezing.   Musculoskeletal:      Cervical back: Normal range of motion.      Right lower leg: No edema.      Left lower leg: No edema.   Lymphadenopathy:      Cervical: No cervical adenopathy.   Skin:     General: Skin is warm and dry.   Neurological:      Mental Status: He is alert and oriented to person, place, and time. Mental status is at baseline.      Sensory: No sensory deficit.      Gait: Gait normal.   Psychiatric:         Mood and Affect: Mood normal.         Behavior: Behavior normal.         Thought Content: " "Thought content normal.         The history was obtained from the review of the chart, patient.    Lab Results:   Lab Results   Component Value Date/Time    Hemoglobin A1C 8.1 (A) 02/11/2025 07:57 AM    Hemoglobin A1C 7.3 (A) 08/07/2024 08:37 AM    Hemoglobin A1C 8.1 (A) 04/10/2024 08:50 AM    BUN 20 10/31/2024 07:00 AM    BUN 23 07/31/2024 09:52 AM    BUN 22 04/03/2024 08:57 AM    Potassium 3.9 10/31/2024 07:00 AM    Potassium 4.5 07/31/2024 09:52 AM    Potassium 4.0 04/03/2024 08:57 AM    Chloride 100 10/31/2024 07:00 AM    Chloride 103 07/31/2024 09:52 AM    Chloride 99 04/03/2024 08:57 AM    CO2 31 10/31/2024 07:00 AM    CO2 33 (H) 07/31/2024 09:52 AM    CO2 30 04/03/2024 08:57 AM    Creatinine 0.81 10/31/2024 07:00 AM    Creatinine 0.71 07/31/2024 09:52 AM    Creatinine 0.76 04/03/2024 08:57 AM    AST 12 10/31/2024 07:00 AM    AST 15 07/31/2024 09:52 AM    AST 13 04/03/2024 08:57 AM    ALT 17 10/31/2024 07:00 AM    ALT 15 07/31/2024 09:52 AM    ALT 13 04/03/2024 08:57 AM    Protein, Total 8.2 (H) 10/31/2024 07:00 AM    Protein, Total 7.2 07/31/2024 09:52 AM    Protein, Total 7.5 04/03/2024 08:57 AM    Albumin 4.6 10/31/2024 07:00 AM    Albumin 4.4 07/31/2024 09:52 AM    Albumin 4.6 04/03/2024 08:57 AM    Globulin 3.6 10/31/2024 07:00 AM    Globulin 2.8 07/31/2024 09:52 AM    Globulin 2.9 04/03/2024 08:57 AM    HDL 33 (L) 07/31/2024 09:52 AM    HDL 37 (L) 04/03/2024 08:57 AM    Triglycerides 170 (H) 07/31/2024 09:52 AM    Triglycerides 269 (H) 04/03/2024 08:57 AM           Imaging Studies: Results Review Statement: No pertinent imaging studies reviewed.    Portions of the record may have been created with voice recognition software. Occasional wrong word or \"sound a like\" substitutions may have occurred due to the inherent limitations of voice recognition software. Read the chart carefully and recognize, using context, where substitutions have occurred.    "

## 2025-02-11 NOTE — TELEPHONE ENCOUNTER
Reason for call:   [] Refill   [x] Prior Auth  [] Other:     Office:   [] PCP/Provider -   [x] Specialty/Provider - Raji Baker     Medication: Humalog/Admelog    Dose/Frequency: 350 units via pump daily     Quantity: 300 ml     Pharmacy: [facility] [place]

## 2025-02-11 NOTE — PATIENT INSTRUCTIONS
Monitor diet and maintain physical activity.  Make sure to drink plenty water throughout the day.     Change carb ratio at 7 am to 5, 10 am and 3 pm to 1.    Start metformin 500 mg twice a day.     Contact the office with any concerns or questions.  Follow-up in 3 months with lab work completed prior to visit.

## 2025-02-12 NOTE — TELEPHONE ENCOUNTER
Holly, nurse from [facility], asking for a call back to answer clinical questions. Thank you.

## 2025-02-12 NOTE — TELEPHONE ENCOUNTER
PA for insulin lispro (humALOG/ADMELOG) 100 units DENIED    Reason:      Message sent to office clinical pool Yes    Denial letter scanned into Media Yes    Appeal started No (Provider will need to decide if appeal is warranted and send clinical documentation to Prior Authorization Team for initiation.)    **Please follow up with your patient regarding denial and next steps**    PA for insulin lispro (humALOG/ADMELOG) 100 [facility]    via    [x]Phone call Case ID # [id number]    [x]PA sent as URGENT    All office notes, labs and other pertaining documents and studies sent. Clinical questions answered. Awaiting determination from insurance company.     Turnaround time for your insurance to make a decision on your Prior Authorization can take 7-21 business days.

## 2025-02-12 NOTE — TELEPHONE ENCOUNTER
PA for insulin lispro (humALOG/ADMELOG) 100 units SUBMITTED to [facility]    via    [x][employer]-Case ID # [id number]  [x]PA sent as URGENT    All office notes, labs and other pertaining documents and studies sent. Clinical questions answered. Awaiting determination from insurance company.     Turnaround time for your insurance to make a decision on your Prior Authorization can take 7-21 business days.

## 2025-02-13 NOTE — TELEPHONE ENCOUNTER
Patient calling stating he went to  his insulin and they told him it still needs a PA and that he can't have his refill until 4/3. He said they told him we need to call [phone] ASAP to get this PA handled. Please call patient with updates. Thank you.

## 2025-02-14 DIAGNOSIS — E88.819 INSULIN RESISTANCE: ICD-10-CM

## 2025-02-14 DIAGNOSIS — E10.65 TYPE 1 DIABETES MELLITUS WITH HYPERGLYCEMIA (HCC): ICD-10-CM

## 2025-02-14 NOTE — TELEPHONE ENCOUNTER
Spoke with [facility] and patient. The patient has a prior auth on file and is connecting to the claim but he has to use [facility] mail order for his medications. He is only able to use [facility] for 2 fills of a medication in 365 days and then scripts have to come from the mail order. Patient is aware. He was able to get a script from [facility] for 85 days. A 90 day order will be sent to [facility].

## 2025-02-17 RX ORDER — METFORMIN HYDROCHLORIDE 500 MG/1
500 TABLET, EXTENDED RELEASE ORAL 2 TIMES DAILY WITH MEALS
Qty: 180 TABLET | Refills: 1 | Status: SHIPPED | OUTPATIENT
Start: 2025-02-17

## 2025-02-18 DIAGNOSIS — E10.10 DKA, TYPE 1 (HCC): ICD-10-CM

## 2025-02-18 RX ORDER — ACYCLOVIR 400 MG/1
1 TABLET ORAL
Qty: 9 EACH | Refills: 1 | Status: SHIPPED | OUTPATIENT
Start: 2025-02-18

## 2025-02-18 NOTE — TELEPHONE ENCOUNTER
Reason for call:   [x] Refill   [] Prior Auth  [] Other:     Office: [facility]  [] PCP/Provider -   [x] Specialty/Provider - Endocrinology/ Raji Bennett     Medication: Continuous Glucose Sensor     Dose/Frequency: G7 sensor/ Use 1 Device every 10 days     Quantity: 9 each     Pharmacy: [facility] home delivery     Does the patient have enough for 3 days?   [] Yes   [x] No - Send as HP to POD

## 2025-02-19 ENCOUNTER — DOCUMENTATION (OUTPATIENT)
Dept: ENDOCRINOLOGY | Facility: HOSPITAL | Age: 30
End: 2025-02-19

## 2025-02-19 NOTE — PROGRESS NOTES
The office received an approval for the patient's Insulin lispro and it was be scanned into his chart.

## 2025-04-22 ENCOUNTER — TELEPHONE (OUTPATIENT)
Age: 30
End: 2025-04-22

## 2025-04-22 NOTE — TELEPHONE ENCOUNTER
Patient called stating his wife spoke with Maribel who told her his script for insulin Lispro is on hold and waiting clarification from the office. They told her that they have reached out and faxed the office multiple times with no response. No faxes seen in the chart.   Please call [facility] at # [phone] Ref# [id number]

## 2025-06-30 DIAGNOSIS — E10.10 DKA, TYPE 1 (HCC): ICD-10-CM

## 2025-07-01 ENCOUNTER — TELEPHONE (OUTPATIENT)
Age: 30
End: 2025-07-01

## 2025-07-01 RX ORDER — ACYCLOVIR 400 MG/1
TABLET ORAL
Qty: 9 EACH | Refills: 1 | Status: SHIPPED | OUTPATIENT
Start: 2025-07-01

## 2025-07-03 NOTE — TELEPHONE ENCOUNTER
PA for DEXCOM G7 SENSOR SUBMITTED to [facility]    via    []CMM-KEY:   []Surescripts-Case ID #   []Availity-Auth ID # NDC #   []Faxed to plan   []Other website   []Phone call Case ID #     [x]PA sent as URGENT    All office notes, labs and other pertaining documents and studies sent. Clinical questions answered. Awaiting determination from insurance company.     Turnaround time for your insurance to make a decision on your Prior Authorization can take 7-21 business days.

## 2025-07-03 NOTE — TELEPHONE ENCOUNTER
PA for dexcom g7 sensor APPROVED     Date(s) approved 6/3/25-7/3/26      Patient advised by          []Tablelist Inchart Message  [x]Phone call   [x]LMOM  []L/M to call office as no active Communication consent on file  []Unable to leave detailed message as VM not approved on Communication consent       Pharmacy advised by    [x]Fax  []Phone call  []Secure Chat    Specialty Pharmacy    []     Approval letter scanned into Media YES

## 2025-07-11 LAB
ALBUMIN SERPL-MCNC: 4.3 G/DL (ref 3.6–5.1)
ALBUMIN/GLOB SERPL: 1.4 (CALC) (ref 1–2.5)
ALP SERPL-CCNC: 90 U/L (ref 36–130)
ALT SERPL-CCNC: 25 U/L (ref 9–46)
AST SERPL-CCNC: 17 U/L (ref 10–40)
BILIRUB SERPL-MCNC: 0.3 MG/DL (ref 0.2–1.2)
BUN SERPL-MCNC: 20 MG/DL (ref 7–25)
BUN/CREAT SERPL: ABNORMAL (CALC) (ref 6–22)
CALCIUM SERPL-MCNC: 9.4 MG/DL (ref 8.6–10.3)
CHLORIDE SERPL-SCNC: 100 MMOL/L (ref 98–110)
CHOLEST SERPL-MCNC: 145 MG/DL
CHOLEST/HDLC SERPL: 5.6 (CALC)
CO2 SERPL-SCNC: 30 MMOL/L (ref 20–32)
CREAT SERPL-MCNC: 0.72 MG/DL (ref 0.6–1.26)
GFR/BSA.PRED SERPLBLD CYS-BASED-ARV: 124 ML/MIN/1.73M2
GLOBULIN SER CALC-MCNC: 3 G/DL (CALC) (ref 1.9–3.7)
GLUCOSE SERPL-MCNC: 144 MG/DL (ref 65–99)
HDLC SERPL-MCNC: 26 MG/DL
NONHDLC SERPL-MCNC: 119 MG/DL (CALC)
POTASSIUM SERPL-SCNC: 4.1 MMOL/L (ref 3.5–5.3)
PROT SERPL-MCNC: 7.3 G/DL (ref 6.1–8.1)
SODIUM SERPL-SCNC: 138 MMOL/L (ref 135–146)
TRIGL SERPL-MCNC: 493 MG/DL

## 2025-07-15 ENCOUNTER — TELEPHONE (OUTPATIENT)
Dept: OTHER | Facility: OTHER | Age: 30
End: 2025-07-15

## 2025-07-15 ENCOUNTER — TELEPHONE (OUTPATIENT)
Age: 30
End: 2025-07-15